# Patient Record
Sex: FEMALE | Race: WHITE | Employment: OTHER | URBAN - METROPOLITAN AREA
[De-identification: names, ages, dates, MRNs, and addresses within clinical notes are randomized per-mention and may not be internally consistent; named-entity substitution may affect disease eponyms.]

---

## 2017-06-29 ENCOUNTER — TELEPHONE (OUTPATIENT)
Dept: FAMILY MEDICINE | Facility: CLINIC | Age: 67
End: 2017-06-29

## 2017-06-29 NOTE — TELEPHONE ENCOUNTER
Per Zooppa message-    Message      Appointment Request From: Ayanna Brannon          With Provider: Sarah Ivory MD [-Primary Care Physician-]          Preferred Date Range: Any date 6/29/2017 or later          Preferred Times: Any          Reason for visit: Request an Appointment          Comments:     I need an appointment as soon as possible.  I have been in great pain in my legs from my sciatic nerve for the last week and a half.  It is not getting better.  You can call me at h:[phone] or c:[phone]

## 2017-06-30 ENCOUNTER — OFFICE VISIT (OUTPATIENT)
Dept: FAMILY MEDICINE | Facility: CLINIC | Age: 67
End: 2017-06-30
Payer: COMMERCIAL

## 2017-06-30 ENCOUNTER — RADIANT APPOINTMENT (OUTPATIENT)
Dept: GENERAL RADIOLOGY | Facility: CLINIC | Age: 67
End: 2017-06-30
Attending: INTERNAL MEDICINE
Payer: COMMERCIAL

## 2017-06-30 VITALS
HEIGHT: 62 IN | OXYGEN SATURATION: 94 % | DIASTOLIC BLOOD PRESSURE: 82 MMHG | BODY MASS INDEX: 35.99 KG/M2 | SYSTOLIC BLOOD PRESSURE: 126 MMHG | HEART RATE: 71 BPM | WEIGHT: 195.6 LBS | TEMPERATURE: 97.6 F

## 2017-06-30 DIAGNOSIS — E66.01 MORBID OBESITY DUE TO EXCESS CALORIES (H): ICD-10-CM

## 2017-06-30 DIAGNOSIS — M54.16 LUMBAR RADICULOPATHY: Primary | ICD-10-CM

## 2017-06-30 PROCEDURE — 72100 X-RAY EXAM L-S SPINE 2/3 VWS: CPT

## 2017-06-30 PROCEDURE — 99213 OFFICE O/P EST LOW 20 MIN: CPT | Performed by: INTERNAL MEDICINE

## 2017-06-30 NOTE — PROGRESS NOTES
"INTERNAL MEDICINE   SUBJECTIVE:                                                    Ayanna Brannon is a 67 year old female who presents to clinic today for the following health issues:      The patient is accompanied by her .     Leg Pain  There will be pain through her butt to her knees when she stands. There is no issue with walking, but she feels that she walks more bent over to protect from the \"zinging\" pain. She is able to sleep okay. The back of her legs are also somewhat tight. She has been swimming which makes no change in her symptoms. She denies any loss of bowel or bladder control or numbness and tingling in her groin area. She has never had these symptoms in the past. She sleeps and lays on her stomach and raising her head in this position will initiate the pain.     Onset: About a week ago.     Description:   Location: Bilateral Legs.   Character: Sharp, lasts only a few seconds.     Intensity: severe    Progression of Symptoms: worse    Accompanying Signs & Symptoms:  Other symptoms: radiation of pain to the bilateral knees     History:   Previous similar pain: no       Precipitating factors:   Trauma or overuse: no     Alleviating factors:  Improved by: rest/inactivity  Therapies Tried and outcome: Rest, 2 Ibuprofen tablets every 6 hours for the past 2 days.       Problem list and histories reviewed & adjusted, as indicated.  Additional history: as documented    Patient Active Problem List   Diagnosis     Urinary incontinence     Hypertension goal BP (blood pressure) < 140/90     Colon polyps     Advanced directives, counseling/discussion     Impaired fasting glucose     Excess body and facial hair     Hyperlipidemia LDL goal <130     External hemorrhoids     Umbilical hernia with obstruction     Past Surgical History:   Procedure Laterality Date     BIOPSY  7/6/16    Uterine for post menoposal bleeding     COLONOSCOPY      polyps first time, clear second     GENITOURINARY SURGERY      Uterine " "for post menoposal bleeding     GYN SURGERY      Uterine for post menoposal bleeding       Social History   Substance Use Topics     Smoking status: Never Smoker     Smokeless tobacco: Never Used     Alcohol use Yes      Comment: seldom     Family History   Problem Relation Age of Onset     Gynecology Mother      HEART DISEASE Father      Circulatory Maternal Grandfather      Genitourinary Problems Sister      Thyroid Disease Daughter      Coronary Artery Disease Paternal Grandfather      Breast Cancer Maternal Grandmother      Thyroid Disease Daughter          Labs reviewed in EPIC    Reviewed and updated as needed this visit by clinical staff  Tobacco  Allergies  Meds  Med Hx  Surg Hx  Fam Hx  Soc Hx      Reviewed and updated as needed this visit by Provider       ROS:  C: NEGATIVE for fever, chills, change in weight  GI: NEGATIVE for nausea, abdominal pain, heartburn, or change in bowel habits  : NEGATIVE for frequency, dysuria, or hematuria  M: POSITIVE for low back pain.   All other systems reviewed and were negative.      This document serves as a record of the services and decisions personally performed and made by Sarah Ivory MD. It was created on his/her behalf by Mary Kate Pollack, a trained medical scribe. The creation of this document is based the provider's statements to the medical scribe.    Scriborin Pollack 11:35 AM, June 30, 2017    OBJECTIVE:   /82 (BP Location: Left arm, Patient Position: Chair, Cuff Size: Adult Regular)  Pulse 71  Temp 97.6  F (36.4  C) (Oral)  Ht 1.562 m (5' 1.5\")  Wt 88.7 kg (195 lb 9.6 oz)  SpO2 94%  BMI 36.36 kg/m2  Body mass index is 36.36 kg/(m^2).  GENERAL: healthy, alert and no distress  MS: no gross musculoskeletal defects noted, no edema. No pain to palpation over the lumbar spine or sacroiliac muscle. Pain to palpation over the gluteal muscles. Negative straight leg raise bilaterally.   NEURO: Normal strength and tone in lower extremity " muscle groups, mentation intact and speech normal. Absent Achilles reflexes bilaterally and 2+ patellar reflexes bilaterally.   PSYCH: mentation appears normal, affect normal/bright    Diagnostic Test Results:  No results found for this or any previous visit (from the past 24 hour(s)).  Results for orders placed or performed in visit on 10/05/16   MA Screening Digital Bilateral    Narrative    SCREENING MAMMOGRAM, BILATERAL, DIGITAL w/CAD - 10/5/2016 3:25 PM    BREAST SYMPTOMS: No current breast complaints.     COMPARISON:  6-5-2015, 12-, 6-.    BREAST DENSITY: Scattered fibroglandular densities.    COMMENTS: No findings of suspicion for malignancy.       Impression    IMPRESSION: BI-RADS CATEGORY: 1 -  Negative    RECOMMENDED FOLLOW-UP: Annual Mammography.    Exam results letter mailed to patient.      CARMENCITA MCARTHUR MD       ASSESSMENT/PLAN:   I spent 15 minutes of time with the patient and >50% of it was in education and counseling regarding back pain treatment options.     1. Lumbar radiculopathy  Ibuprofen is working for pain relief.  Will see if physical therapy works prior to do advanced imaging and epidurals. Discussed with patient and  warning symptoms to seek emergency care.   - [facility] HAND, AND CHIROPRACTIC REFERRAL  - XR Lumbar Spine 2/3 Views    Morbid obesity - weight is clearly putting a strain on her back.  Advised to continue with swimming as long as this doesn't bother her.     Patient Instructions   - Schedule appointment with physical therapy. The next step if this does not help is an MRI.  - You can use Biofreeze in conjunction with Ibuprofen.       The information in this document, created by the medical scribe for me, accurately reflects the services I personally performed and the decisions made by me. I have reviewed and approved this document for accuracy prior to leaving the patient care area.  Sarah Ivory MD  11:36 AM, 06/30/17    Sarah Ivory MD  [facility]  [place]    Start: 11:49 AM   End: 12:04 PM

## 2017-06-30 NOTE — MR AVS SNAPSHOT
After Visit Summary   6/30/2017    Ayanna Brannon    MRN: [id number]           Patient Information     Date Of Birth          1950        Visit Information        Provider Department      6/30/2017 11:30 AM Sarah Ivory MD [facility]        Today's Diagnoses     Lumbar radiculopathy    -  1      Care Instructions    - Schedule appointment with physical therapy. The next step if this does not help is an MRI.  - You can use Biofreeze in conjunction with Ibuprofen.     [facility]    If you have any questions regarding to your visit please contact your care team:     Team Pink:   Clinic Hours Telephone Number   Internal Medicine:  Dr. Sarah Parker NP       7am-7pm  Monday - Thursday   7am-5pm  Fridays  [phone]  (Appointment scheduling available 24/7)    Questions about your visit?  Team Line  [phone]   Urgent Care - [place] and [place] - 11am-9pm Monday-Friday Saturday-Sunday- 9am-5pm   [place] - 5pm-9pm Monday-Friday Saturday-Sunday- 9am-5pm  [phone] - [place]   [phone] - [place]       What options do I have for visits at the clinic other than the traditional office visit?  To expand how we care for you, many of our providers are utilizing electronic visits (e-visits) and telephone visits, when medically appropriate, for interactions with their patients rather than a visit in the clinic.   We also offer nurse visits for many medical concerns. Just like any other service, we will bill your insurance company for this type of visit based on time spent on the phone with your provider. Not all insurance companies cover these visits. Please check with your medical insurance if this type of visit is covered. You will be responsible for any charges that are not paid by your insurance.      E-visits via CommercialTribe:  generally incur a $35.00 fee.  Telephone visits:  Time spent on the phone: *charged  based on time that is spent on the phone in increments of 10 minutes. Estimated cost:   5-10 mins $30.00   11-20 mins. $59.00   21-30 mins. $85.00   Use Mobile Backstagehart (secure email communication and access to your chart) to send your primary care provider a message or make an appointment. Ask someone on your Team how to sign up for Oculus360t.    For a Price Quote for your services, please call our Cont[employer]ul Price Line at [phone].    As always, Thank you for trusting us with your health care needs!    Discharged by Tiffany FINE CMA ([facility])            Follow-ups after your visit        Additional Services     [facility] HAND, AND CHIR[facility]RACTIC REFERRAL       **This order will print in the [facility] Scheduling Office**    Physical Therapy, Hand Therapy and Chiropractic Care are available through:    *[facility]  *[facility]  *[facility]    Call one number to schedule at any of the above locations: [phone].    Your provider has referred you to: Physical Therapy at [facility] or [facility]    Indication/Reason for Referral: Low Back Pain  Onset of Illness: 2 weeks  Therapy Orders: Evaluate and Treat  Special Programs:   Special Request:     Dixie Colin      Additional Comments for the Therapist or Chiropractor:     Please be aware that coverage of these services is subject to the terms and limitations of your health insurance plan.  Call member services at your health plan with any benefit or coverage questions.      Please bring the following to your appointment:    *Your personal calendar for scheduling future appointments  *Comfortable clothing                  Who to contact     If you have questions or need follow up information about today's clinic visit or your schedule please contact [facility] [place] directly at [phone].  Normal or non-critical lab and imaging results will be communicated to you by MyChart, letter or phone within 4 business days after the clinic has  "received the results. If you do not hear from us within 7 days, please contact the clinic through OpinewsTV or phone. If you have a critical or abnormal lab result, we will notify you by phone as soon as possible.  Submit refill requests through OpinewsTV or call your pharmacy and they will forward the refill request to us. Please allow 3 business days for your refill to be completed.          Additional Information About Your Visit        Power Efficiency[employer]Pegg Information     OpinewsTV gives you secure access to your electronic health record. If you see a primary care provider, you can also send messages to your care team and make appointments. If you have questions, please call your primary care clinic.  If you do not have a primary care provider, please call [phone] and they will assist you.        Care EveryWhere ID     This is your Care EveryWhere ID. This could be used by other organizations to access your [facility] medical records  [id number]        Your Vitals Were     Pulse Temperature Height Pulse Oximetry BMI (Body Mass Index)       71 97.6  F (36.4  C) (Oral) 5' 1.5\" (1.562 m) 94% 36.36 kg/m2        Blood Pressure from Last 3 Encounters:   06/30/17 126/82   10/05/16 122/82   09/01/16 126/82    Weight from Last 3 Encounters:   06/30/17 195 lb 9.6 oz (88.7 kg)   10/05/16 189 lb 3.2 oz (85.8 kg)   09/01/16 190 lb (86.2 kg)              We Performed the Following     [facility] PT, HAND, AND CHIROPRACTIC REFERRAL     XR Lumbar Spine 2/3 Views        Primary Care Provider Office Phone # Fax #    Sarah Ivory [phone]       [address] [address]        Equal Access to Services     GAURI KING AH: Hadjanes Colbert, mi friedman, kwabean ramosalmajose marie, madi otero. So [facility] [phone].    ATENCIÓN: Si habla español, tiene a quezada disposición servicios gratuitos de asistencia lingüística. Llame al [phone].    We " comply with applicable federal civil rights laws and [place] laws. We do not discriminate on the basis of race, color, national origin, age, disability sex, sexual orientation or gender identity.            Thank you!     Thank you for choosing [facility]  for your care. Our goal is always to provide you with excellent care. Hearing back from our patients is one way we can continue to improve our services. Please take a few minutes to complete the written survey that you may receive in the mail after your visit with us. Thank you!             Your Updated Medication List - Protect others around you: Learn how to safely use, store and throw away your medicines at www.disposemymeds.org.          This list is accurate as of: 6/30/17 12:05 PM.  Always use your most recent med list.                   Brand Name Dispense Instructions for use Diagnosis    ASPIRIN LOW STRENGTH 81 MG chewable tablet   Generic drug:  aspirin      1 TABLET DAILY        metoprolol 25 MG 24 hr tablet    TOPROL-XL    180 tablet    Take 1 tablet (25 mg) by mouth 2 times daily    Essential hypertension with goal blood pressure less than 140/90       Multi-vitamin Tabs tablet      Take 1 tablet by mouth daily

## 2017-06-30 NOTE — PATIENT INSTRUCTIONS
- Schedule appointment with physical therapy. The next step if this does not help is an MRI.  - You can use Biofreeze in conjunction with Ibuprofen.     [facility]    If you have any questions regarding to your visit please contact your care team:     Team Pink:   Clinic Hours Telephone Number   Internal Medicine:  Dr. Sarah Parker, NP       7am-7pm  Monday - Thursday   7am-5pm  Fridays  [phone]  (Appointment scheduling available 24/7)    Questions about your visit?  Team Line  [phone]   [facility] [place] [facility] [place] [facility]pm Monday-Friday Saturday-Sunday- 9am-5pm   [place] - 5pm-9pm Monday-Friday Saturday-Sunday- 9am-5pm  [phone] - [place]   [phone] - [place]       What options do I have for visits at the clinic other than the traditional office visit?  To expand how we care for you, many of our providers are utilizing electronic visits (e-visits) and telephone visits, when medically appropriate, for interactions with their patients rather than a visit in the clinic.   We also offer nurse visits for many medical concerns. Just like any other service, we will bill your insurance company for this type of visit based on time spent on the phone with your provider. Not all insurance companies cover these visits. Please check with your medical insurance if this type of visit is covered. You will be responsible for any charges that are not paid by your insurance.      E-visits via NutshellMail:  generally incur a $35.00 fee.  Telephone visits:  Time spent on the phone: *charged based on time that is spent on the phone in increments of 10 minutes. Estimated cost:   5-10 mins $30.00   11-20 mins. $59.00   21-30 mins. $85.00   Use NutshellMail (secure email communication and access to your chart) to send your primary care provider a message or make an appointment. Ask someone on your Team how to sign up for NutshellMail.    For a Price  Quote for your services, please call our Consumer Price Line at [phone].    As always, Thank you for trusting us with your health care needs!    Discharged by Tiffany FINE CMA ([facility])

## 2017-06-30 NOTE — NURSING NOTE
"Chief Complaint   Patient presents with     Musculoskeletal Problem     Bilateral leg pain, a week and a half        Initial /82 (BP Location: Left arm, Patient Position: Chair, Cuff Size: Adult Regular)  Pulse 71  Temp 97.6  F (36.4  C) (Oral)  Ht 5' 1.5\" (1.562 m)  Wt 195 lb 9.6 oz (88.7 kg)  SpO2 94%  BMI 36.36 kg/m2 Estimated body mass index is 36.36 kg/(m^2) as calculated from the following:    Height as of this encounter: 5' 1.5\" (1.562 m).    Weight as of this encounter: 195 lb 9.6 oz (88.7 kg).  Medication Reconciliation: complete   Tiffany FINE CMA ([facility])      "

## 2017-07-11 ENCOUNTER — THERAPY VISIT (OUTPATIENT)
Dept: PHYSICAL THERAPY | Facility: CLINIC | Age: 67
End: 2017-07-11
Payer: COMMERCIAL

## 2017-07-11 DIAGNOSIS — M54.16 LUMBAR RADICULOPATHY: Primary | ICD-10-CM

## 2017-07-11 PROCEDURE — 97161 PT EVAL LOW COMPLEX 20 MIN: CPT | Mod: GP | Performed by: PHYSICAL THERAPIST

## 2017-07-11 PROCEDURE — 97110 THERAPEUTIC EXERCISES: CPT | Mod: GP | Performed by: PHYSICAL THERAPIST

## 2017-07-11 NOTE — PROGRESS NOTES
"[facility] Initial Evaluation      Subjective:    Patient is a 67 year old female presenting with rehab back hpi. The history is provided by the patient. No  was used.   Ayanna Brannon is a 67 year old female with a lumbar condition.  Condition occurred with:  Insidious onset.  Condition occurred: for unknown reasons.  This is a new condition  Pt states she simply woke up with \"zingers\" into both legs June 19 without specific incident.  Has not had any back pain at any point.  Seems better over the past three days. Referred to PT 06/30/2017.      Radiates to:  Thigh right and thigh left.  Quality: \"zingers\" and is intermittent and reported as 3/10.   Pain is worse in the A.M..  Exacerbated by: climbing stairs, carrying objects, twisting, reaching upward, walking, turning in bed. Relieved by: keeping the head down.  Since onset symptoms are gradually improving (fewer \"zingers\" lately).  Special tests:  X-ray (in chart 06/30/2017).      General health as reported by patient is good.  Pertinent medical history includes:  Overweight and high blood pressure.  Medical allergies: no.  Other surgeries include:  Other (hysterectomy).  Current medications:  Anti-inflammatory and high blood pressure medication.  Current occupation is retired.        Barriers include:  None as reported by the patient.    Red flags:  None as reported by the patient.    Pt states her goal is to \"get back to walking without worrying.\"                    Objective:    System         Lumbar/SI Evaluation  ROM:      Strength: TA MMT 1/5  Lumbar Myotomes:    T12-L3 (Hip Flex):  Left: 5    Right: 5  L2-4 (Quads):  Left:  5    Right:  5  L4 (Ankle DF):  Left:  5    Right:  5  L5 (Great Toe Ext): Left: 5    Right: 5   S1 (Toe Raise):  Left: 5    Right: 5  Lumbar DTR's:    L4 (Quad):  Left:  2   Right:  2  S1 (Achilles):  Left:  1   Right:  1    Lumbar Dermtomes:  normal                Neural Tension/Mobility:  "     Left side:SLR or SLR w/DF  negative.     Right side:   SLR w/DF or SLR  negative.   Lumbar Palpation:  normal        Lumbar Provocation:      Left negative with:  PROM hip    Right negative with:  PROM hip    SI joint/Sacrum:    Negative Bill thigh thrustANASTASIYA Lumbar Evaluation    Posture:  Sitting: fair  Standing: fair  Lordosis: WNL  Lateral Shift: no  Correction of Posture: no effect    Movement Loss:  Flexion (Flex): min  Extension (EXT): mod and pain  Side Glide R (SG R): mod  Side Glide L (SG L): mod  Test Movements:  FIS: During: no effect  After: no effect  Pretest Movements: to B posterior knees  Repeat FIS: During: no effect  After: no effect    EIS: During: peripheralizing  After: peripheralizing    Repeat EIS: During: centralizing  After: centralizing  Mechanical Response: IncROM  NANCI: During: no effect  After: no effect    Repeat NANCI: During: no effect  After: no effect    EIL: During: centralizing  After: centralizing    Repeat EIL: During: centralizing  After: centralizing  Mechanical Response: IncROM        Conclusion: derangement  Principle of Treatment:      Extension: REIL                                           ROS    Assessment/Plan:      Patient is a 67 year old female with lumbar complaints.    Patient has the following significant findings with corresponding treatment plan.                Diagnosis 1:  Lumbar radiculopathy  Pain -  hot/cold therapy and directional preference exercise  Decreased ROM/flexibility - manual therapy and therapeutic exercise  Decreased strength - therapeutic exercise and therapeutic activities  Decreased function - therapeutic activities    Therapy Evaluation Codes:   1) History comprised of:   Personal factors that impact the plan of care:      None.    Comorbidity factors that impact the plan of care are:      High blood pressure and Overweight.     Medications impacting care:  Anti-inflammatory and High blood pressure.  2) Examination of Body Systems comprised of:   Body structures and functions that impact the plan of care:      Lumbar spine.   Activity limitations that impact the plan of care are:      Lifting, Stairs and Walking.  3) Clinical presentation characteristics are:   Stable/Uncomplicated.  4) Decision-Making    Low complexity using standardized patient assessment instrument and/or measureable assessment of functional outcome.  Cumulative Therapy Evaluation is: Low complexity.    Previous and current functional limitations:  (See Goal Flow Sheet for this information)    Short term and Long term goals: (See Goal Flow Sheet for this information)     Communication ability:  Patient appears to be able to clearly communicate and understand verbal and written communication and follow directions correctly.  Treatment Explanation - The following has been discussed with the patient:   RX ordered/plan of care  Anticipated outcomes  Possible risks and side effects  This patient would benefit from PT intervention to resume normal activities.   Rehab potential is good.    Frequency:  2 X week, once daily  Duration:  for 3 weeks  Discharge Plan:  Achieve all LTG.  Independent in home treatment program.  Reach maximal therapeutic benefit.    Please refer to the daily flowsheet for treatment today, total treatment time and time spent performing 1:1 timed codes.

## 2017-07-11 NOTE — MR AVS SNAPSHOT
After Visit Summary   7/11/2017    Ayanna Brannon    MRN: [id number]           Patient Information     Date Of Birth          1950        Visit Information        Provider Department      7/11/2017 12:40 PM Lester Wild PT [facility] [place] [facility]        Today's Diagnoses     Lumbar radiculopathy    -  1       Follow-ups after your visit        Your next 10 appointments already scheduled     Jul 19, 2017  2:40 PM CDT   [facility] with Lester Wild PT   [facility] [place] [facility] ([facility] [place])    [address]  [address]  [place] [address]   [phone]            Jul 26, 2017  2:40 PM CDT   [facility] Spine with Lester Wild PT   [facility] [place] [facility] ([facility] [place])    [address]  [address]  [place] [address]   [phone]              Who to contact     If you have questions or need follow up information about today's clinic visit or your schedule please contact [facility] directly at [phone].  Normal or non-critical lab and imaging results will be communicated to you by algranohart, letter or phone within 4 business days after the clinic has received the results. If you do not hear from us within 7 days, please contact the clinic through [email or web address] or phone. If you have a critical or abnormal lab result, we will notify you by phone as soon as possible.  Submit refill requests through Gamervision or call your pharmacy and they will forward the refill request to us. Please allow 3 business days for your refill to be completed.          Additional Information About Your Visit        MyChart Information     Gamervision gives you secure access to your electronic health record. If you see a primary care provider, you can also send messages to your care team and make appointments. If you have questions, please call your primary care clinic.  If you do not have a primary care provider,  please call [phone] and they will assist you.        Care EveryWhere ID     This is your Care EveryWhere ID. This could be used by other organizations to access your [place] medical records  [id number]         Blood Pressure from Last 3 Encounters:   06/30/17 126/82   10/05/16 122/82   09/01/16 126/82    Weight from Last 3 Encounters:   06/30/17 88.7 kg (195 lb 9.6 oz)   10/05/16 85.8 kg (189 lb 3.2 oz)   09/01/16 86.2 kg (190 lb)              We Performed the Following     PT Eval, Low Complexity (18226)     Therapeutic Exercises        Primary Care Provider Office Phone # Fax #    Sarah Ivory [phone]       [address] [address]        Equal Access to Services     [facility]: Hadii aad ku hadasho Soomaali, waaxda luqadaha, qaybta kaalmada adeegyada, waxay cayetanoin hayaan adekelly moreau . So [facility] [phone].    ATENCIÓN: Si habla español, tiene a quezada disposición servicios gratuitos de asistencia lingüística. Llame al [phone].    We comply with applicable federal civil rights laws and [place] laws. We do not discriminate on the basis of race, color, national origin, age, disability sex, sexual orientation or gender identity.            Thank you!     Thank you for choosing [facility]  for your care. Our goal is always to provide you with excellent care. Hearing back from our patients is one way we can continue to improve our services. Please take a few minutes to complete the written survey that you may receive in the mail after your visit with us. Thank you!             Your Updated Medication List - Protect others around you: Learn how to safely use, store and throw away your medicines at www.disposemymeds.org.          This list is accurate as of: 7/11/17  2:12 PM.  Always use your most recent med list.                   Brand Name Dispense Instructions for use Diagnosis    ASPIRIN LOW  STRENGTH 81 MG chewable tablet   Generic drug:  aspirin      1 TABLET DAILY        metoprolol 25 MG 24 hr tablet    TOPROL-XL    180 tablet    Take 1 tablet (25 mg) by mouth 2 times daily    Essential hypertension with goal blood pressure less than 140/90       Multi-vitamin Tabs tablet      Take 1 tablet by mouth daily

## 2017-07-19 ENCOUNTER — THERAPY VISIT (OUTPATIENT)
Dept: PHYSICAL THERAPY | Facility: CLINIC | Age: 67
End: 2017-07-19
Payer: COMMERCIAL

## 2017-07-19 DIAGNOSIS — M54.16 LUMBAR RADICULOPATHY: ICD-10-CM

## 2017-07-19 PROCEDURE — 97530 THERAPEUTIC ACTIVITIES: CPT | Mod: GP | Performed by: PHYSICAL THERAPIST

## 2017-07-19 PROCEDURE — 97110 THERAPEUTIC EXERCISES: CPT | Mod: GP | Performed by: PHYSICAL THERAPIST

## 2017-07-19 NOTE — PROGRESS NOTES
"Subjective:    HPI                    Objective:    System    Physical Exam    General     ROS    Assessment/Plan:      SUBJECTIVE  Subjective: Pt reports fewer zingers overall but back of thigs feel tight still.  Walking can still be sore but standing is easier.   Current Pain level:  (not rated numerically)   Changes in function:  Yes (See Goal flowsheet attached for changes in current functional level)     Adverse reaction to treatment or activity:  None    OBJECTIVE  Objective: Pt ambulates reciprocally on stairs with railing, although winded on ascent upon completion.  \"Tight\" in hamstrings with standing trunk flexion but no pain.  Standing trunk extension reported to be sore on first repetition but got better with repetition.     ASSESSMENT  Ayanna continues to require intervention to meet STG and LTG's: PT  Patient is progressing as expected.  Response to therapy has shown an improvement in  function  Progress made towards STG/LTG?  Yes (See Goal flowsheet attached for updates on achievement of STG and LTG)    PLAN  Current treatment program is being advanced to more complex exercises.  One visit remains scheduled.  Reassess and determine further course of care from there.    PTA/ATC plan:  N/A    Please refer to the daily flowsheet for treatment today, total treatment time and time spent performing 1:1 timed codes.              "

## 2017-07-19 NOTE — MR AVS SNAPSHOT
After Visit Summary   7/19/2017    Ayanna Brnanon    MRN: [id number]           Patient Information     Date Of Birth          1950        Visit Information        Provider Department      7/19/2017 2:40 PM Lester Wild PT [facility] [place] [facility]        Today's Diagnoses     Lumbar radiculopathy           Follow-ups after your visit        Your next 10 appointments already scheduled     Jul 26, 2017  2:40 PM CDT   [facility] Sp[facility] with Lester Wild PT   [facility] [place] [facility] ([facility] [place])    [address]  [address]  [place] [address] [phone]              Who to contact     If you have questions or need follow up information about today's clinic visit or your schedule please contact [facility] directly at [phone].  Normal or non-critical lab and imaging results will be communicated to you by EcoStarthart, letter or phone within 4 business days after the clinic has received the results. If you do not hear from us within 7 days, please contact the clinic through EcoStarthart or phone. If you have a critical or abnormal lab result, we will notify you by phone as soon as possible.  Submit refill requests through GeeYuu or call your pharmacy and they will forward the refill request to us. Please allow 3 business days for your refill to be completed.          Additional Information About Your Visit        MyChart Information     GeeYuu gives you secure access to your electronic health record. If you see a primary care provider, you can also send messages to your care team and make appointments. If you have questions, please call your primary care clinic.  If you do not have a primary care provider, please call [phone] and they will assist you.        Care EveryWhere ID     This is your Care EveryWhere ID. This could be used by other organizations to access your [facility] medical records  [id number]          Blood Pressure from Last 3 Encounters:   06/30/17 126/82   10/05/16 122/82   09/01/16 126/82    Weight from Last 3 Encounters:   06/30/17 88.7 kg (195 lb 9.6 oz)   10/05/16 85.8 kg (189 lb 3.2 oz)   09/01/16 86.2 kg (190 lb)              We Performed the Following     Therapeutic Activities     Therapeutic Exercises        Primary Care Provider Office Phone # Fax #    Sarah Ivory [phone]       [address] [address]        Equal Access to Services     [facility]: Hadii aad ku hadasho Soomaali, waaxda luqadaha, qaybta kaalmada adeegyada, madi moreau . So [facility] [phone].    ATENCIÓN: Si habla español, tiene a quezada disposición servicios gratuitos de asistencia lingüística. Llame al [phone].    We comply with applicable federal civil rights laws and [place] laws. We do not discriminate on the basis of race, color, national origin, age, disability sex, sexual orientation or gender identity.            Thank you!     Thank you for choosing [facility]   for your care. Our goal is always to provide you with excellent care. Hearing back from our patients is one way we can continue to improve our services. Please take a few minutes to complete the written survey that you may receive in the mail after your visit with us. Thank you!             Your Updated Medication List - Protect others around you: Learn how to safely use, store and throw away your medicines at www.disposemymeds.org.          This list is accurate as of: 7/19/17  3:37 PM.  Always use your most recent med list.                   Brand Name Dispense Instructions for use Diagnosis    ASPIRIN LOW STRENGTH 81 MG chewable tablet   Generic drug:  aspirin      1 TABLET DAILY        metoprolol 25 MG 24 hr tablet    TOPROL-XL    180 tablet    Take 1 tablet (25 mg) by mouth 2 times daily    Essential hypertension with goal blood  pressure less than 140/90       Multi-vitamin Tabs tablet      Take 1 tablet by mouth daily

## 2017-07-20 ENCOUNTER — TELEPHONE (OUTPATIENT)
Dept: FAMILY MEDICINE | Facility: CLINIC | Age: 67
End: 2017-07-20

## 2017-07-20 DIAGNOSIS — M54.16 LUMBAR RADICULOPATHY: Primary | ICD-10-CM

## 2017-07-20 NOTE — TELEPHONE ENCOUNTER
Patient has developed continual numbness/tingling in her feet. This began about two weeks ago. She has completed 2 PT sessions.   She does have feeling and control.    Per provider note on 6/30/17  Patient Instructions   - Schedule appointment with physical therapy. The next step if this does not help is an MRI.  - You can use Biofreeze in conjunction with Ibuprofen.    Please advise on MRI.  Africa Vega RN

## 2017-07-20 NOTE — TELEPHONE ENCOUNTER
Reason for Call:  Other call back    Detailed comments: patient calling and stating that she was seen a few weeks back. Patient says she is starting to have numbness in her feet now. Patient would like to know if she should come back in to be seen, or what would be the next steps. Please contact patient to discuss further.    Phone Number Patient can be reached at: Home number on file [phone] (home) or cell phone [phone] Cell phone. Messages could be left on both phones    Best Time: any time    Can we leave a detailed message on this number? YES    Call taken on 7/20/2017 at 8:25 AM by Johana Eric

## 2017-07-21 NOTE — TELEPHONE ENCOUNTER
Called patient and completed answers.  MRi order placed.  Phone number to imaging given.    Patient then asked to go to [facility] instead.  Please fax to     Patient will be calling in 1 hour to schedule    Esperanza Shields RN

## 2017-07-24 ENCOUNTER — TRANSFERRED RECORDS (OUTPATIENT)
Dept: HEALTH INFORMATION MANAGEMENT | Facility: CLINIC | Age: 67
End: 2017-07-24

## 2017-07-26 ENCOUNTER — THERAPY VISIT (OUTPATIENT)
Dept: PHYSICAL THERAPY | Facility: CLINIC | Age: 67
End: 2017-07-26
Payer: COMMERCIAL

## 2017-07-26 DIAGNOSIS — M54.16 LUMBAR RADICULOPATHY: ICD-10-CM

## 2017-07-26 PROCEDURE — 97140 MANUAL THERAPY 1/> REGIONS: CPT | Mod: GP | Performed by: PHYSICAL THERAPIST

## 2017-07-26 PROCEDURE — 97110 THERAPEUTIC EXERCISES: CPT | Mod: GP | Performed by: PHYSICAL THERAPIST

## 2017-07-26 NOTE — PROGRESS NOTES
"Subjective:    HPI                    Objective:    System    Physical Exam    General     ROS    Assessment/Plan:      SUBJECTIVE  Subjective: Pt indicates not doing as well as last week.  Has felt some increased tingling down both legs with continued \"zingers\" at times.  Completed MRI earlier this week and is awaiting results.   Current Pain level: 3/10   Changes in function:  Yes (See Goal flowsheet attached for changes in current functional level)     Adverse reaction to treatment or activity:  None    OBJECTIVE  Objective: No change in symptoms with trunk AROM all directions in standing and REIL.  Long leg distraction reported to reduce tingling to B calves.     ASSESSMENT  Ayanna continues to require intervention to meet STG and LTG's: PT  Patient has experienced an exacerbation of symptoms.  Response to therapy has shown lack of progress in  function  Progress made towards STG/LTG?  Yes (See Goal flowsheet attached for updates on achievement of STG and LTG)    PLAN  Pt presents today with changed symptoms since last I last saw her and is awaiting imaging results.  No further PT scheduled currently but anticipate guidance based upon diagnostic work up.    PTA/ATC plan:  N/A    Please refer to the daily flowsheet for treatment today, total treatment time and time spent performing 1:1 timed codes.              "

## 2017-07-26 NOTE — MR AVS SNAPSHOT
After Visit Summary   7/26/2017    Ayanna Brannon    MRN: [id number]           Patient Information     Date Of Birth          1950        Visit Information        Provider Department      7/26/2017 2:40 PM Lester Wild PT [facility] [place] [facility]        Today's Diagnoses     Lumbar radiculopathy           Follow-ups after your visit        Who to contact     If you have questions or need follow up information about today's clinic visit or your schedule please contact [facility] directly at [phone].  Normal or non-critical lab and imaging results will be communicated to you by Pinnacle Biologicshart, letter or phone within 4 business days after the clinic has received the results. If you do not hear from us within 7 days, please contact the clinic through FTL Global Solutionst or phone. If you have a critical or abnormal lab result, we will notify you by phone as soon as possible.  Submit refill requests through Food on the Table or call your pharmacy and they will forward the refill request to us. Please allow 3 business days for your refill to be completed.          Additional Information About Your Visit        MyChart Information     Food on the Table gives you secure access to your electronic health record. If you see a primary care provider, you can also send messages to your care team and make appointments. If you have questions, please call your primary care clinic.  If you do not have a primary care provider, please call [phone] and they will assist you.        Care EveryWhere ID     This is your Care EveryWhere ID. This could be used by other organizations to access your [place] medical records  [id number]         Blood Pressure from Last 3 Encounters:   06/30/17 126/82   10/05/16 122/82   09/01/16 126/82    Weight from Last 3 Encounters:   06/30/17 88.7 kg (195 lb 9.6 oz)   10/05/16 85.8 kg (189 lb 3.2 oz)   09/01/16 86.2 kg (190 lb)              We Performed the Following      Manual Ther Tech, 1+Regions, EA 15 min     Therapeutic Exercises        Primary Care Provider Office Phone # Fax #    Sarah Rula Ivory [phone]       [address] [address]        Equal Access to Services     [facility] : Hadii aad ku hadasho Soomaali, waaxda luqadaha, qaybta kaalmada adeegyada, waxay idiin hayaan adeeg khkourtneysh la'richien ah. So [facility] [phone].    ATENCIÓN: Si habla español, tiene a quezada disposición servicios gratuitos de asistencia lingüística. Evelyn al [phone].    We comply with applicable federal civil rights laws and [place] laws. We do not discriminate on the basis of race, color, national origin, age, disability sex, sexual orientation or gender identity.            Thank you!     Thank you for choosing [facility]   for your care. Our goal is always to provide you with excellent care. Hearing back from our patients is one way we can continue to improve our services. Please take a few minutes to complete the written survey that you may receive in the mail after your visit with us. Thank you!             Your Updated Medication List - Protect others around you: Learn how to safely use, store and throw away your medicines at www.disposemymeds.org.          This list is accurate as of: 7/26/17  4:22 PM.  Always use your most recent med list.                   Brand Name Dispense Instructions for use Diagnosis    ASPIRIN LOW STRENGTH 81 MG chewable tablet   Generic drug:  aspirin      1 TABLET DAILY        metoprolol 25 MG 24 hr tablet    TOPROL-XL    180 tablet    Take 1 tablet (25 mg) by mouth 2 times daily    Essential hypertension with goal blood pressure less than 140/90       Multi-vitamin Tabs tablet      Take 1 tablet by mouth daily

## 2017-07-28 ENCOUNTER — TELEPHONE (OUTPATIENT)
Dept: FAMILY MEDICINE | Facility: CLINIC | Age: 67
End: 2017-07-28

## 2017-07-28 DIAGNOSIS — Z79.899 HIGH RISK MEDICATION USE: Primary | ICD-10-CM

## 2017-07-28 DIAGNOSIS — M54.16 LUMBAR RADICULOPATHY: ICD-10-CM

## 2017-07-28 NOTE — TELEPHONE ENCOUNTER
Reason for Call:  Other call back    Detailed comments:  Patient calling. [facility] She did not hear the results yet. Please call her.     Phone Number Patient can be reached at: Home number on file [phone] (home)    Best Time:  Any     Can we leave a detailed message on this number? YES    Call taken on 7/28/2017 at 10:13 AM by Loli Cervantes

## 2017-07-28 NOTE — TELEPHONE ENCOUNTER
I called.  Will see spine surgery.  Continue with physical therapy .  Ibuprofen bid.  Will come in for creatinine.

## 2017-08-01 DIAGNOSIS — Z79.899 HIGH RISK MEDICATION USE: ICD-10-CM

## 2017-08-01 LAB
ANION GAP SERPL CALCULATED.3IONS-SCNC: 14 MMOL/L (ref 3–14)
BUN SERPL-MCNC: 20 MG/DL (ref 7–30)
CALCIUM SERPL-MCNC: 9 MG/DL (ref 8.5–10.1)
CHLORIDE SERPL-SCNC: 106 MMOL/L (ref 94–109)
CO2 SERPL-SCNC: 20 MMOL/L (ref 20–32)
CREAT SERPL-MCNC: 0.75 MG/DL (ref 0.52–1.04)
GFR SERPL CREATININE-BSD FRML MDRD: 78 ML/MIN/1.7M2
GLUCOSE SERPL-MCNC: 214 MG/DL (ref 70–99)
POTASSIUM SERPL-SCNC: 3.8 MMOL/L (ref 3.4–5.3)
SODIUM SERPL-SCNC: 140 MMOL/L (ref 133–144)

## 2017-08-01 PROCEDURE — 36415 COLL VENOUS BLD VENIPUNCTURE: CPT | Performed by: INTERNAL MEDICINE

## 2017-08-01 PROCEDURE — 80048 BASIC METABOLIC PNL TOTAL CA: CPT | Performed by: INTERNAL MEDICINE

## 2017-08-04 ENCOUNTER — TRANSFERRED RECORDS (OUTPATIENT)
Dept: HEALTH INFORMATION MANAGEMENT | Facility: CLINIC | Age: 67
End: 2017-08-04

## 2017-08-04 NOTE — TELEPHONE ENCOUNTER
Report has been received and given to Dr. Ivory. Ofelia Perez,    need for outpatient follow-up/return to ED if symptoms worsen, persist or questions arise

## 2017-08-18 DIAGNOSIS — I10 ESSENTIAL HYPERTENSION: ICD-10-CM

## 2017-08-21 NOTE — TELEPHONE ENCOUNTER
metoprolol (TOPROL-XL) 25 MG 24 hr tablet      Last Written Prescription Date: 10/5/16  Last Fill Quantity: 180, # refills: 11  Last Office Visit with G, P or [facility] prescribing provider: 6/30/17       Potassium   Date Value Ref Range Status   08/01/2017 3.8 3.4 - 5.3 mmol/L Final     Creatinine   Date Value Ref Range Status   08/01/2017 0.75 0.52 - 1.04 mg/dL Final     BP Readings from Last 3 Encounters:   06/30/17 126/82   10/05/16 122/82   09/01/16 126/82

## 2017-08-22 RX ORDER — METOPROLOL SUCCINATE 25 MG/1
TABLET, EXTENDED RELEASE ORAL
Qty: 180 TABLET | Refills: 2 | Status: SHIPPED | OUTPATIENT
Start: 2017-08-22 | End: 2018-06-18

## 2017-08-22 NOTE — TELEPHONE ENCOUNTER
Prescription approved per [facility] Refill Protocol.  Gudelia Gomez, RN - BC

## 2017-08-29 NOTE — PROGRESS NOTES
Pt last seen in PT 07/26.  Called pt.  She reports has since had injection that did not provide any change.  Will be seeing Dr Dumont for surgical consult f/u later this week and will call me with plan.  She also indicates has trip planned to [place] 09/09-16 and will listen to body for response to activities.

## 2017-09-01 ENCOUNTER — TRANSFERRED RECORDS (OUTPATIENT)
Dept: HEALTH INFORMATION MANAGEMENT | Facility: CLINIC | Age: 67
End: 2017-09-01

## 2017-09-25 ENCOUNTER — THERAPY VISIT (OUTPATIENT)
Dept: PHYSICAL THERAPY | Facility: CLINIC | Age: 67
End: 2017-09-25
Payer: COMMERCIAL

## 2017-09-25 DIAGNOSIS — M54.16 LUMBAR RADICULOPATHY: ICD-10-CM

## 2017-09-25 PROCEDURE — 97164 PT RE-EVAL EST PLAN CARE: CPT | Mod: GP | Performed by: PHYSICAL THERAPIST

## 2017-09-25 PROCEDURE — 97110 THERAPEUTIC EXERCISES: CPT | Mod: GP | Performed by: PHYSICAL THERAPIST

## 2017-09-25 NOTE — PROGRESS NOTES
Subjective:    HPI                    Objective:    System    Physical Exam    General     ROS    Assessment/Plan:      PROGRESS  REPORT    Progress reporting period is from 07/11/2017 to today.       SUBJECTIVE  Subjective: Pt reports she still has some tingling in her legs but has no pain and is noticing definite progress.  Injection 08/18 pt states wasn't helpful initially but has noticed progress since then.    Current Pain level: 0/10.     Initial Pain level: 3/10.   Changes in function:  Yes (See Goal flowsheet attached for changes in current functional level)  Adverse reaction to treatment or activity: None    OBJECTIVE  Objective: No discomfort or gross restriction with trunk AROM all directions.  TA MMT 1/5.  Negative Bill, thigh thrust, FADIR, SLR B.  No distal strength or sensation to light touch asymmetry.     ASSESSMENT/PLAN  Updated problem list and treatment plan: Diagnosis 1:  LBP  Decreased strength - therapeutic exercise and therapeutic activities  Decreased function - therapeutic activities and neuromuscular re-education  STG/LTGs have been met or progress has been made towards goals:  Yes (See Goal flow sheet completed today.)  Assessment of Progress: The patient's condition is improving.  Self Management Plans:  Patient has been instructed in a home treatment program.  I have re-evaluated this patient and find that the nature, scope, duration and intensity of the therapy is appropriate for the medical condition of the patient.  Ayanna continues to require the following intervention to meet STG and LTG's:  PT    Recommendations:  Pt returns to PT today with change in status relative to when last seen in 07/26/2017.  Revised plan of care to focus on progression of strengthening.  Anticipate two add'l visits of PT over the next two to three weeks.    Please refer to the daily flowsheet for treatment today, total treatment time and time spent performing 1:1 timed codes.

## 2017-09-25 NOTE — MR AVS SNAPSHOT
After Visit Summary   9/25/2017    Ayanna Brannon    MRN: [id number]           Patient Information     Date Of Birth          1950        Visit Information        Provider Department      9/25/2017 1:10 PM Lester Wild PT [facility] [place] [facility]        Today's Diagnoses     Lumbar radiculopathy           Follow-ups after your visit        Your next 10 appointments already scheduled     Oct 02, 2017  1:10 PM CDT   [facility] with Lester Wild PT   [facility] [place] [facility] ([facility] [place])    [address]  [address]  [place] [id number] [phone]              Who to contact     If you have questions or need follow up information about today's clinic visit or your schedule please contact [facility] directly at [phone].  Normal or non-critical lab and imaging results will be communicated to you by Credit Coachhart, letter or phone within 4 business days after the clinic has received the results. If you do not hear from us within 7 days, please contact the clinic through Credit Coachhart or phone. If you have a critical or abnormal lab result, we will notify you by phone as soon as possible.  Submit refill requests through Penumbra or call your pharmacy and they will forward the refill request to us. Please allow 3 business days for your refill to be completed.          Additional Information About Your Visit        MyChart Information     Penumbra gives you secure access to your electronic health record. If you see a primary care provider, you can also send messages to your care team and make appointments. If you have questions, please call your primary care clinic.  If you do not have a primary care provider, please call [phone] and they will assist you.        Care EveryWhere ID     This is your Care EveryWhere ID. This could be used by other organizations to access your [place] medical records  [id number]          Blood Pressure from Last 3 Encounters:   06/30/17 126/82   10/05/16 122/82   09/01/16 126/82    Weight from Last 3 Encounters:   06/30/17 88.7 kg (195 lb 9.6 oz)   10/05/16 85.8 kg (189 lb 3.2 oz)   09/01/16 86.2 kg (190 lb)              We Performed the Following     PT Re-Eval ([id number])     Therapeutic Exercises        Primary Care Provider Office Phone # Fax #    Sarah Rula Ivory [phone] [address]  [facility] [address]        Equal Access to Services     [facility]: Hadii aad elton hadryano Sotory, waaxda luqadaha, qaybta kaalmada adekellyyada, madi moreau . So [facility] [phone].    ATENCIÓN: Si habla español, tiene a quezada disposición servicios gratuitos de asistencia lingüística. [facility] [phone].    We comply with applicable federal civil rights laws and [place] laws. We do not discriminate on the basis of race, color, national origin, age, disability sex, sexual orientation or gender identity.            Thank you!     Thank you for choosing [facility]  for your care. Our goal is always to provide you with excellent care. Hearing back from our patients is one way we can continue to improve our services. Please take a few minutes to complete the written survey that you may receive in the mail after your visit with us. Thank you!             Your Updated Medication List - Protect others around you: Learn how to safely use, store and throw away your medicines at www.disposemymeds.org.          This list is accurate as of: 9/25/17  3:26 PM.  Always use your most recent med list.                   Brand Name Dispense Instructions for use Diagnosis    ASPIRIN LOW STRENGTH 81 MG chewable tablet   Generic drug:  aspirin      1 TABLET DAILY        * metoprolol 25 MG 24 hr tablet    TOPROL-XL    180 tablet    Take 1 tablet (25 mg) by mouth 2 times daily    Essential hypertension with goal blood pressure less than 140/90        * metoprolol 25 MG 24 hr tablet    TOPROL-XL    180 tablet    TAKE 1 TABLET(25 MG) BY MOUTH TWICE DAILY    Essential hypertension       Multi-vitamin Tabs tablet      Take 1 tablet by mouth daily        * Notice:  This list has 2 medication(s) that are the same as other medications prescribed for you. Read the directions carefully, and ask your doctor or other care provider to review them with you.

## 2017-10-02 ENCOUNTER — THERAPY VISIT (OUTPATIENT)
Dept: PHYSICAL THERAPY | Facility: CLINIC | Age: 67
End: 2017-10-02
Payer: COMMERCIAL

## 2017-10-02 DIAGNOSIS — M54.16 LUMBAR RADICULOPATHY: ICD-10-CM

## 2017-10-02 PROCEDURE — 97112 NEUROMUSCULAR REEDUCATION: CPT | Mod: GP | Performed by: PHYSICAL THERAPIST

## 2017-10-02 PROCEDURE — 97110 THERAPEUTIC EXERCISES: CPT | Mod: GP | Performed by: PHYSICAL THERAPIST

## 2017-10-02 NOTE — PROGRESS NOTES
"Subjective:    HPI  Oswestry Score: 0 %                 Objective:    System    Physical Exam    General     ROS    Assessment/Plan:      DISCHARGE REPORT    Progress reporting period is from 07/11/2017 to today.       SUBJECTIVE  Subjective: \"I can do all of the things I'd like to be able to do.  This is what I was hoping for.\"    Current Pain level: 0/10.     Initial Pain level: 3/10.   Changes in function:  Yes (See Goal flowsheet attached for changes in current functional level)  Adverse reaction to treatment or activity: None    OBJECTIVE  Objective: No discomfort trunk AROM all directions.  TA MMT 1/5.     ASSESSMENT/PLAN  Updated problem list and treatment plan: Diagnosis 1:  Home program  STG/LTGs have been met or progress has been made towards goals:  Yes (See Goal flow sheet completed today.)  Assessment of Progress: The patient has met all of their long term goals.  Self Management Plans:  Patient is independent in a home treatment program.  I have re-evaluated this patient and find that the nature, scope, duration and intensity of the therapy is appropriate for the medical condition of the patient.  Ayanna continues to require the following intervention to meet STG and LTG's:  PT intervention is no longer required to meet STG/LTG.    Recommendations:  Given progress, pt agrees discharge to [facility] but will let me know if there are further issues.    Please refer to the daily flowsheet for treatment today, total treatment time and time spent performing 1:1 timed codes.                "

## 2017-10-02 NOTE — MR AVS SNAPSHOT
After Visit Summary   10/2/2017    Ayanna Brannon    MRN: [id number]           Patient Information     Date Of Birth          1950        Visit Information        Provider Department      10/2/2017 1:10 PM Lester Wild PT [facility] [place] [facility]        Today's Diagnoses     Lumbar radiculopathy           Follow-ups after your visit        Who to contact     If you have questions or need follow up information about today's clinic visit or your schedule please contact [facility] directly at [phone].  Normal or non-critical lab and imaging results will be communicated to you by [employer], letter or phone within 4 business days after the clinic has received the results. If you do not hear from us within 7 days, please contact the clinic through VIRIDAXISt or phone. If you have a critical or abnormal lab result, we will notify you by phone as soon as possible.  Submit refill requests through Nonoba or call your pharmacy and they will forward the refill request to us. Please allow 3 business days for your refill to be completed.          Additional Information About Your Visit        MyChart Information     Nonoba gives you secure access to your electronic health record. If you see a primary care provider, you can also send messages to your care team and make appointments. If you have questions, please call your primary care clinic.  If you do not have a primary care provider, please call [phone] and they will assist you.        Care EveryWhere ID     This is your Care EveryWhere ID. This could be used by other organizations to access your [place] medical records  [id number]         Blood Pressure from Last 3 Encounters:   06/30/17 126/82   10/05/16 122/82   09/01/16 126/82    Weight from Last 3 Encounters:   06/30/17 88.7 kg (195 lb 9.6 oz)   10/05/16 85.8 kg (189 lb 3.2 oz)   09/01/16 86.2 kg (190 lb)              We Performed the Following      [facility] Progress Notes Report     Neuromuscular Re-Education     Therapeutic Exercises        Primary Care Provider Office Phone # Fax #    Sarah Ivory [phone]       [address] [facility] [place]  [place] [address]        Equal Access to Services     [facility]RISTINE : Hadii aad ku hadryano Soomaali, waaxda luqadaha, qaybta kaalmada adeegyada, waxlivier idiin hayaan adekelly huff lapollyn . So [facility] [phone].    ATENCIÓN: Si habla español, tiene a quezada disposición servicios gratuitos de asistencia lingüística. Llame al [phone].    We comply with applicable federal civil rights laws and [place] laws. We do not discriminate on the basis of race, color, national origin, age, disability, sex, sexual orientation, or gender identity.            Thank you!     Thank you for choosing [facility]   for your care. Our goal is always to provide you with excellent care. Hearing back from our patients is one way we can continue to improve our services. Please take a few minutes to complete the written survey that you may receive in the mail after your visit with us. Thank you!             Your Updated Medication List - Protect others around you: Learn how to safely use, store and throw away your medicines at www.disposemymeds.org.          This list is accurate as of: 10/2/17  2:19 PM.  Always use your most recent med list.                   Brand Name Dispense Instructions for use Diagnosis    ASPIRIN LOW STRENGTH 81 MG chewable tablet   Generic drug:  aspirin      1 TABLET DAILY        * metoprolol 25 MG 24 hr tablet    TOPROL-XL    180 tablet    Take 1 tablet (25 mg) by mouth 2 times daily    Essential hypertension with goal blood pressure less than 140/90       * metoprolol 25 MG 24 hr tablet    TOPROL-XL    180 tablet    TAKE 1 TABLET(25 MG) BY MOUTH TWICE DAILY    Essential hypertension       Multi-vitamin Tabs tablet      Take 1 tablet by mouth daily        * Notice:  This list  has 2 medication(s) that are the same as other medications prescribed for you. Read the directions carefully, and ask your doctor or other care provider to review them with you.

## 2018-01-30 ENCOUNTER — OFFICE VISIT (OUTPATIENT)
Dept: INTERNAL MEDICINE | Facility: CLINIC | Age: 68
End: 2018-01-30
Payer: COMMERCIAL

## 2018-01-30 VITALS
WEIGHT: 197 LBS | HEIGHT: 68 IN | SYSTOLIC BLOOD PRESSURE: 112 MMHG | RESPIRATION RATE: 16 BRPM | OXYGEN SATURATION: 94 % | HEART RATE: 89 BPM | BODY MASS INDEX: 29.86 KG/M2 | DIASTOLIC BLOOD PRESSURE: 72 MMHG

## 2018-01-30 DIAGNOSIS — B02.9 HERPES ZOSTER WITHOUT COMPLICATION: Primary | ICD-10-CM

## 2018-01-30 PROCEDURE — 99213 OFFICE O/P EST LOW 20 MIN: CPT | Performed by: INTERNAL MEDICINE

## 2018-01-30 RX ORDER — VALACYCLOVIR HYDROCHLORIDE 1 G/1
TABLET, FILM COATED ORAL
Refills: 0 | COMMUNITY
Start: 2018-01-26 | End: 2019-02-22

## 2018-01-30 NOTE — MR AVS SNAPSHOT
After Visit Summary   1/30/2018    Ayanna Brannon    MRN: [id number]           Patient Information     Date Of Birth          1950        Visit Information        Provider Department      1/30/2018 3:00 PM Sarah Ivory MD [facility]        Today's Diagnoses     Herpes zoster without complication    -  1      Care Instructions    You can use a mild soap and water to wash.    Wash your clothes in hot water.      [facility]    If you have any questions regarding to your visit please contact your care team:     Team Pink:   Clinic Hours Telephone Number   Internal Medicine:  Dr. Sarah Parker, NP       7am-7pm  Monday - Thursday   7am-5pm  Fridays  [phone]  (Appointment scheduling available 24/7)    Questions about your visit?  Team Line  [phone]   Urgent Care - [place] [facility] - 11am-9pm Monday-Friday Saturday-Sunday- 9am-5pm   [place] - 5pm-9pm Monday-Friday Saturday-Sunday- 9am-5pm  [phone] - [facility]  [phone] - [place]       What options do I have for visits at the clinic other than the traditional office visit?  To expand how we care for you, many of our providers are utilizing electronic visits (e-visits) and telephone visits, when medically appropriate, for interactions with their patients rather than a visit in the clinic.   We also offer nurse visits for many medical concerns. Just like any other service, we will bill your insurance company for this type of visit based on time spent on the phone with your provider. Not all insurance companies cover these visits. Please check with your medical insurance if this type of visit is covered. You will be responsible for any charges that are not paid by your insurance.      E-visits via Wise Connect:  generally incur a $35.00 fee.  Telephone visits:  Time spent on the phone: *charged based on time that is spent on the phone in increments  "of 10 minutes. Estimated cost:   5-10 mins $30.00   11-20 mins. $59.00   21-30 mins. $85.00   Use OpenChimehart (secure email communication and access to your chart) to send your primary care provider a message or make an appointment. Ask someone on your Team how to sign up for OpenChimehart.    For a Price Quote for your services, please call our Lively Line at [phone].    As always, Thank you for trusting us with your health care needs!    Discharged by Tiffany FINE CMA ([facility])            Follow-ups after your visit        Who to contact     If you have questions or need follow up information about today's clinic visit or your schedule please contact [facility] directly at [phone].  Normal or non-critical lab and imaging results will be communicated to you by OpenChimehart, letter or phone within 4 business days after the clinic has received the results. If you do not hear from us within 7 days, please contact the clinic through KP Corpt or phone. If you have a critical or abnormal lab result, we will notify you by phone as soon as possible.  Submit refill requests through Numira Biosciences or call your pharmacy and they will forward the refill request to us. Please allow 3 business days for your refill to be completed.          Additional Information About Your Visit        OpenChimehart Information     [employer] gives you secure access to your electronic health record. If you see a primary care provider, you can also send messages to your care team and make appointments. If you have questions, please call your primary care clinic.  If you do not have a primary care provider, please call [phone] and they will assist you.        Care EveryWhere ID     This is your Care EveryWhere ID. This could be used by other organizations to access your [place] medical records  [id number]        Your Vitals Were     Pulse Respirations Height Pulse Oximetry BMI (Body Mass Index)       89 16 5' 7.5\" (1.715 m) 94% 30.4 kg/m2        " Blood Pressure from Last 3 Encounters:   01/30/18 112/72   06/30/17 126/82   10/05/16 122/82    Weight from Last 3 Encounters:   01/30/18 197 lb (89.4 kg)   06/30/17 195 lb 9.6 oz (88.7 kg)   10/05/16 189 lb 3.2 oz (85.8 kg)              Today, you had the following     No orders found for display         Today's Medication Changes          These changes are accurate as of 1/30/18  4:05 PM.  If you have any questions, ask your nurse or doctor.               These medicines have changed or have updated prescriptions.        Dose/Directions    metoprolol succinate 25 MG 24 hr tablet   Commonly known as:  TOPROL-XL   This may have changed:  Another medication with the same name was removed. Continue taking this medication, and follow the directions you see here.   Used for:  Essential hypertension   Changed by:  Sarah Ivory MD        TAKE 1 TABLET(25 MG) BY MOUTH TWICE DAILY   Quantity:  180 tablet   Refills:  2                Primary Care Provider Office Phone # Fax #    Sarah Ivory [phone] [address] [facility] [address]        Equal Access to Services     [facility]: Hadii diana conde hadasho Sochandlerali, waaxda luqadaha, qaybta kaalmada adeegyada, madi moreau . So [facility] [phone].    ATENCIÓN: Si habla español, tiene a quezada disposición servicios gratuitos de asistencia lingüística. Llame al [phone].    We comply with applicable federal civil rights laws and [place] laws. We do not discriminate on the basis of race, color, national origin, age, disability, sex, sexual orientation, or gender identity.            Thank you!     Thank you for choosing [facility]  for your care. Our goal is always to provide you with excellent care. Hearing back from our patients is one way we can continue to improve our services. Please take a few minutes to complete the written survey that you may receive in the mail after your visit with us.  Thank you!             Your Updated Medication List - Protect others around you: Learn how to safely use, store and throw away your medicines at www.disposemymeds.org.          This list is accurate as of 1/30/18  4:05 PM.  Always use your most recent med list.                   Brand Name Dispense Instructions for use Diagnosis    ASPIRIN LOW STRENGTH 81 MG chewable tablet   Generic drug:  aspirin      1 TABLET DAILY        metoprolol succinate 25 MG 24 hr tablet    TOPROL-XL    180 tablet    TAKE 1 TABLET(25 MG) BY MOUTH TWICE DAILY    Essential hypertension       Multi-vitamin Tabs tablet      Take 1 tablet by mouth daily        valACYclovir 1000 mg tablet    VALTREX     TK 1 T PO TID FOR 7 DAYS

## 2018-01-30 NOTE — PROGRESS NOTES
INTERNAL MEDICINE  SUBJECTIVE:   Ayanna Brannon is a 67 year old female who presents to clinic today for the following health issues:    Patient presents to clinic today for shingles.    She fell a week or so ago on her back but it did not hurt when she fell on Thursday. By Sunday night her  put Biofreeze on her and she had a rash the next day. She thought she was allergic to Biofreeze. She was not seen until the Friday after. She was diagnosed for shingles by [facility]. It is across her back and the pain is not severe. She has been taking Valtrex and one Aleve at night and a couple ibuprofen during the day. Does not think she needs anything else for pain. If she sits straight she feels fine but if she slouches it hurts. She rates the pain as a 3 or 4. The first week she thought she had cracked a rib because it was so painful to sleep. She even went to a chiropractor who told her not to lay on her stomach. She has been able to sleep for the past few days.       Problem list and histories reviewed & adjusted, as indicated.  Additional history: as documented    Labs reviewed in EPIC    Reviewed and updated as needed this visit by clinical staff  Tobacco  Allergies  Meds  Med Hx  Surg Hx  Fam Hx  Soc Hx      Reviewed and updated as needed this visit by Provider         ROS:  C: NEGATIVE for fever, chills, change in weight  I: NEGATIVE for worrisome moles or lesions, POSITIVE for shingles  M: NEGATIVE for significant arthralgias or myalgia  N: NEGATIVE for weakness, dizziness or paresthesias  P: NEGATIVE for changes in mood or affect    This document serves as a record of the services and decisions personally performed and made by Sarah Ivory MD. It was created on his/her behalf by Jennifer Thomas trained medical scribe. The creation of this document is based the provider's statements to the medical scribes.    Mendoza Thomas 3:50 PM, January 30, 2018    OBJECTIVE:   /72  Pulse 89  Resp 16  Ht 1.715  "m (5' 7.5\")  Wt 89.4 kg (197 lb)  SpO2 94%  BMI 30.4 kg/m2  Body mass index is 30.4 kg/(m^2).  GENERAL: healthy, alert and no distress  SKIN: Left flank vesicular erythematous rash that stops at the midline  NEURO: Normal strength and tone, mentation intact and speech normal  PSYCH: mentation appears normal, affect normal/bright    Diagnostic Test Results:  Results for orders placed or performed in visit on 08/01/17   **Basic metabolic panel FUTURE anytime   Result Value Ref Range    Sodium 140 133 - 144 mmol/L    Potassium 3.8 3.4 - 5.3 mmol/L    Chloride 106 94 - 109 mmol/L    Carbon Dioxide 20 20 - 32 mmol/L    Anion Gap 14 3 - 14 mmol/L    Glucose 214 (H) 70 - 99 mg/dL    Urea Nitrogen 20 7 - 30 mg/dL    Creatinine 0.75 0.52 - 1.04 mg/dL    GFR Estimate 78 >60 mL/min/1.7m2    GFR Estimate If Black >90   GFR Calc   >60 mL/min/1.7m2    Calcium 9.0 8.5 - 10.1 mg/dL       ASSESSMENT/PLAN:   1. Herpes zoster without complication  Patient will finish her Valtrex treatment and was told to keep the area clean and covered.  Shingrix in 6 months.      Patient Instructions   You can use a mild soap and water to wash.    Wash your clothes in hot water.      The information in this document, created by a scribe for me, accurately reflects the services I personally performed and the decisions made by me. I have reviewed and approved this document for accuracy.     I spent 14 minutes of time with the patient and >50% of it was in education and counseling regarding shingles.    Sarah Ivory MD  [facility]    Start 3:50 PM  End 4:04 PM    "

## 2018-01-30 NOTE — PATIENT INSTRUCTIONS
You can use a mild soap and water to wash.    Wash your clothes in hot water.      [facility]    If you have any questions regarding to your visit please contact your care team:     Team Pink:   Clinic Hours Telephone Number   Internal Medicine:  Dr. Sarah Parker NP       7am-7pm  Monday - Thursday   7am-5pm  Fridays  [phone]  (Appointment scheduling available 24/7)    Questions about your visit?  Team Line  [phone]   Urgent Care - [place] - 11am-9pm Monday-Friday Saturday-Sunday- 9am-5pm   [place] - 5pm-9pm Monday-Friday Saturday-Sunday- 9am-5pm  [phone] - [facility]  [phone] - [place]       What options do I have for visits at the clinic other than the traditional office visit?  To expand how we care for you, many of our providers are utilizing electronic visits (e-visits) and telephone visits, when medically appropriate, for interactions with their patients rather than a visit in the clinic.   We also offer nurse visits for many medical concerns. Just like any other service, we will bill your insurance company for this type of visit based on time spent on the phone with your provider. Not all insurance companies cover these visits. Please check with your medical insurance if this type of visit is covered. You will be responsible for any charges that are not paid by your insurance.      E-visits via agencyQ:  generally incur a $35.00 fee.  Telephone visits:  Time spent on the phone: *charged based on time that is spent on the phone in increments of 10 minutes. Estimated cost:   5-10 mins $30.00   11-20 mins. $59.00   21-30 mins. $85.00   Use KlickSportst (secure email communication and access to your chart) to send your primary care provider a message or make an appointment. Ask someone on your Team how to sign up for agencyQ.    For a Price Quote for your services, please call our Consumer Price Line at  [phone].    As always, Thank you for trusting us with your health care needs!    Discharged by Tiffany FINE CMA ([facility])

## 2018-06-18 DIAGNOSIS — I10 ESSENTIAL HYPERTENSION: ICD-10-CM

## 2018-06-18 RX ORDER — METOPROLOL SUCCINATE 25 MG/1
TABLET, EXTENDED RELEASE ORAL
Qty: 180 TABLET | Refills: 2 | Status: SHIPPED | OUTPATIENT
Start: 2018-06-18 | End: 2018-12-14

## 2018-08-21 ENCOUNTER — TRANSFERRED RECORDS (OUTPATIENT)
Dept: HEALTH INFORMATION MANAGEMENT | Facility: CLINIC | Age: 68
End: 2018-08-21

## 2018-08-21 LAB — HEMOCCULT STL QL IA: NEGATIVE

## 2018-12-14 DIAGNOSIS — I10 ESSENTIAL HYPERTENSION: ICD-10-CM

## 2018-12-14 RX ORDER — METOPROLOL SUCCINATE 25 MG/1
TABLET, EXTENDED RELEASE ORAL
Qty: 180 TABLET | Refills: 0 | Status: SHIPPED | OUTPATIENT
Start: 2018-12-14 | End: 2018-12-31

## 2018-12-14 NOTE — TELEPHONE ENCOUNTER
Reason for Call:  Medication or medication refill:    Do you use a [facility]?  Name of the pharmacy and phone number for the current request:  physical copy of [facility]   Or        [id number] - [place], [place] - [address]  [address]  [place] [address]  Phone: [phone] Fax: [phone]          Name of the medication requested: metoprolol succinate (TOPROL-XL) 25 MG 24 hr tablet       Other request: Patient would like a year of prescription     Can we leave a detailed message on this number? YES    Phone number patient can be reached at: Other phone number: [phone]      Best Time: any     Call taken on 12/14/2018 at 9:46 AM by Rafat Rasmussen

## 2018-12-14 NOTE — TELEPHONE ENCOUNTER
"Routing refill request to provider for review/approval because:  To advise on 1 year supply of medication as patient requests. Per [facility] protocol can only fill for 3 months.       Requested Prescriptions   Pending Prescriptions Disp Refills     metoprolol succinate ER (TOPROL-XL) 25 MG 24 hr tablet  Last Written Prescription Date:  6/18/18  Last Fill Quantity: 180,  # refills: 2   Last office visit: 6/30/2017 with prescribing provider:       Future Office Visit:   Next 5 appointments (look out 90 days)    Feb 22, 2019 11:00 AM CST  PHYSICAL with Sarah Ivory MD  [facility] [address] [address]  [phone]          180 tablet 2    Beta-Blockers Protocol Passed - 12/14/2018  1:40 PM       Passed - Blood pressure under 140/90 in past 12 months    BP Readings from Last 3 Encounters:   01/30/18 112/72   06/30/17 126/82   10/05/16 122/82                Passed - Patient is age 6 or older       Passed - Recent (12 mo) or future (30 days) visit within the authorizing provider's specialty    Patient had office visit in the last 12 months or has a visit in the next 30 days with authorizing provider or within the authorizing provider's specialty.  See \"Patient Info\" tab in inbasket, or \"Choose Columns\" in Meds & Orders section of the refill encounter.              Gudelia Gomez RN - BC      "

## 2018-12-31 ENCOUNTER — TELEPHONE (OUTPATIENT)
Dept: FAMILY MEDICINE | Facility: CLINIC | Age: 68
End: 2018-12-31

## 2018-12-31 DIAGNOSIS — I10 ESSENTIAL HYPERTENSION: ICD-10-CM

## 2018-12-31 RX ORDER — METOPROLOL SUCCINATE 25 MG/1
TABLET, EXTENDED RELEASE ORAL
Qty: 180 TABLET | Refills: 0 | Status: SHIPPED | OUTPATIENT
Start: 2018-12-31 | End: 2019-02-22

## 2018-12-31 NOTE — TELEPHONE ENCOUNTER
Noted in epic that patient has been on extended release BID for several years now.  Please advise if ok to continue with extended release    We cannot approve a 1 year supply  Will be 1 year since she was seen on January 30th.  Further refills will be discussed at OV  See 12/29/18 mychart encounter    Esperanza Shields RN

## 2018-12-31 NOTE — TELEPHONE ENCOUNTER
Reason for call:  Other   Patient called regarding (reason for call): call back and prescription  Additional comments: [facility] pharmacy calling needs a clarification of a med. Medication is Metoprolol 25mg. Is this suppose to be extended release, patient also wondering if it was suppose to be a 90 day supply or if she asked for a full year supply.     Phone number to reach patient:  Other phone number:  [phone]    Best Time:  any    Can we leave a detailed message on this number?  YES

## 2019-01-21 ENCOUNTER — TELEPHONE (OUTPATIENT)
Dept: FAMILY MEDICINE | Facility: CLINIC | Age: 69
End: 2019-01-21

## 2019-01-21 DIAGNOSIS — I10 ESSENTIAL HYPERTENSION: ICD-10-CM

## 2019-01-21 NOTE — TELEPHONE ENCOUNTER
Patient has always been getting the Metoprolol Succinate (24 hour tab) prescribed since 2010  Called [facility] and they stated that patient has never picked up Metoprolol from them so they were unable to confirm what patient has been getting previously  Called patient and explained to her that we have always prescribed Metoprolol Succinate (24 hour tab) BID  She verified that she has not been getting this prescribed elsewhere  She just assumed that since she was taking this BID, that it was the regular release and not the extended release  Advised her to continue same med without change and can discuss further at OV  She verbalized understanding    Esperanza Shields RN

## 2019-01-21 NOTE — TELEPHONE ENCOUNTER
Reason for call:  Med question  Patient called regarding (reason for call): prescription-Metoprolol er 25 1 tab twice daily. Patient has been on plain tab and the pharmacy wants the RX for the plain tab. They have sent 2 electronically and the ER was sent twice. The patient wants the regular. If the Dr is changing it, they are asking for someone to tell the patient    Phone number to reach patient: [phone]  Best Time: 10-7    Can we leave a detailed message on this number?  YES

## 2019-02-20 ASSESSMENT — ENCOUNTER SYMPTOMS
PALPITATIONS: 0
SHORTNESS OF BREATH: 0
DIARRHEA: 0
HEADACHES: 0
HEMATURIA: 0
CONSTIPATION: 0
DYSURIA: 0
FREQUENCY: 0
SORE THROAT: 0
DIZZINESS: 0
HEMATOCHEZIA: 1
NERVOUS/ANXIOUS: 0
BREAST MASS: 0
FEVER: 0
PARESTHESIAS: 0
CHILLS: 0
WEAKNESS: 0
JOINT SWELLING: 0
COUGH: 0
ARTHRALGIAS: 0
NAUSEA: 0
HEARTBURN: 0
MYALGIAS: 0
ABDOMINAL PAIN: 0

## 2019-02-20 ASSESSMENT — ACTIVITIES OF DAILY LIVING (ADL): CURRENT_FUNCTION: NO ASSISTANCE NEEDED

## 2019-02-22 ENCOUNTER — TELEPHONE (OUTPATIENT)
Dept: FAMILY MEDICINE | Facility: CLINIC | Age: 69
End: 2019-02-22

## 2019-02-22 ENCOUNTER — OFFICE VISIT (OUTPATIENT)
Dept: INTERNAL MEDICINE | Facility: CLINIC | Age: 69
End: 2019-02-22
Payer: COMMERCIAL

## 2019-02-22 VITALS
BODY MASS INDEX: 35.96 KG/M2 | TEMPERATURE: 97.3 F | RESPIRATION RATE: 16 BRPM | SYSTOLIC BLOOD PRESSURE: 130 MMHG | WEIGHT: 195.4 LBS | HEART RATE: 79 BPM | DIASTOLIC BLOOD PRESSURE: 76 MMHG | HEIGHT: 62 IN | OXYGEN SATURATION: 95 %

## 2019-02-22 DIAGNOSIS — E78.5 HYPERLIPIDEMIA LDL GOAL <130: ICD-10-CM

## 2019-02-22 DIAGNOSIS — E66.01 MORBID OBESITY DUE TO EXCESS CALORIES (H): ICD-10-CM

## 2019-02-22 DIAGNOSIS — I10 ESSENTIAL HYPERTENSION: ICD-10-CM

## 2019-02-22 DIAGNOSIS — Z00.00 ROUTINE GENERAL MEDICAL EXAMINATION AT A HEALTH CARE FACILITY: Primary | ICD-10-CM

## 2019-02-22 DIAGNOSIS — R73.01 IMPAIRED FASTING GLUCOSE: ICD-10-CM

## 2019-02-22 DIAGNOSIS — K62.5 RECTAL BLEEDING: ICD-10-CM

## 2019-02-22 DIAGNOSIS — I10 HYPERTENSION GOAL BP (BLOOD PRESSURE) < 140/90: ICD-10-CM

## 2019-02-22 DIAGNOSIS — Z12.31 VISIT FOR SCREENING MAMMOGRAM: ICD-10-CM

## 2019-02-22 PROCEDURE — G0438 PPPS, INITIAL VISIT: HCPCS | Performed by: INTERNAL MEDICINE

## 2019-02-22 RX ORDER — METOPROLOL SUCCINATE 25 MG/1
TABLET, EXTENDED RELEASE ORAL
Qty: 60 TABLET | Refills: 0 | Status: CANCELLED | OUTPATIENT
Start: 2019-02-22

## 2019-02-22 RX ORDER — METOPROLOL SUCCINATE 25 MG/1
TABLET, EXTENDED RELEASE ORAL
Qty: 180 TABLET | Refills: 11 | Status: SHIPPED | OUTPATIENT
Start: 2019-02-22 | End: 2019-02-22

## 2019-02-22 RX ORDER — METOPROLOL SUCCINATE 25 MG/1
TABLET, EXTENDED RELEASE ORAL
Qty: 30 TABLET | Refills: 0 | Status: SHIPPED | OUTPATIENT
Start: 2019-02-22 | End: 2019-02-22

## 2019-02-22 RX ORDER — METOPROLOL SUCCINATE 25 MG/1
TABLET, EXTENDED RELEASE ORAL
Qty: 60 TABLET | Refills: 0 | Status: SHIPPED | OUTPATIENT
Start: 2019-02-22 | End: 2019-03-04

## 2019-02-22 ASSESSMENT — ENCOUNTER SYMPTOMS
NAUSEA: 0
FREQUENCY: 0
DYSURIA: 0
CONSTIPATION: 0
ABDOMINAL PAIN: 0
SHORTNESS OF BREATH: 0
HEARTBURN: 0
DIARRHEA: 0
NERVOUS/ANXIOUS: 0
ARTHRALGIAS: 0
HEMATOCHEZIA: 1
EYE PAIN: 0
SORE THROAT: 0
HEADACHES: 0
DIZZINESS: 0
FEVER: 0
PARESTHESIAS: 0
WEAKNESS: 0
HEMATURIA: 0
PALPITATIONS: 0
BREAST MASS: 0
JOINT SWELLING: 0
CHILLS: 0
MYALGIAS: 0
COUGH: 0

## 2019-02-22 ASSESSMENT — ACTIVITIES OF DAILY LIVING (ADL): CURRENT_FUNCTION: NO ASSISTANCE NEEDED

## 2019-02-22 ASSESSMENT — MIFFLIN-ST. JEOR: SCORE: 1365.33

## 2019-02-22 ASSESSMENT — PAIN SCALES - GENERAL: PAINLEVEL: NO PAIN (0)

## 2019-02-22 NOTE — TELEPHONE ENCOUNTER
Patient wants medication to be sent to [facility]. She only wants a one month supply sent to [facility] on [address] until she gets the [facility] mail service. She will call back to pink team and let them know what [facility] mail service she wants the rest of her medication to be sent too.    Leia Ray CMA on 2/22/2019 at 11:57 AM

## 2019-02-22 NOTE — PROGRESS NOTES
"SUBJECTIVE:   Ayanna Brannon is a 69 year old female who presents for Preventive Visit.  Are you in the first 12 months of your Medicare coverage?  No    Annual Wellness Visit     In general, how would you rate your overall health?  Good    Frequency of exercise:  1 day/week    Do you usually eat at least 4 servings of fruit and vegetables a day, include whole grains    & fiber and avoid regularly eating high fat or \"junk\" foods?  No    Taking medications regularly:  Yes    Medication side effects:  None    Ability to successfully perform activities of daily living:  No assistance needed    Home Safety:  No safety concerns identified    Hearing Impairment:  Difficulty following a conversation in a noisy restaurant or crowded room and need to ask people to speak up or repeat themselves    In the past 6 months, have you been bothered by leaking of urine? Yes    In general, how would you rate your overall mental or emotional health?  Good    PHQ-2 Total Score: 0    Additional concerns today:  No    Do you feel safe in your environment? Yes    Do you have a Health Care Directive? No: Advance care planning reviewed with patient; information given to patient to review.    Fall risk  Fallen 2 or more times in the past year?: No  Any fall with injury in the past year?: No    Cognitive Screening   1) Repeat 3 items (Leader, Season, Table)    2) Clock draw: NORMAL  3) 3 item recall: Recalls 3 objects  Results: 3 items recalled: COGNITIVE IMPAIRMENT LESS LIKELY    Mini-CogTM Copyright S Donell. Licensed by the author for use in [facility]; reprinted with permission (judi@.[facility]). All rights reserved.      Do you have sleep apnea, excessive snoring or daytime drowsiness?: no    Reviewed and updated as needed this visit by clinical staff  Tobacco  Allergies  Meds  Problems  Med Hx  Surg Hx  Fam Hx       Reviewed and updated as needed this visit by Provider  Tobacco  Allergies  Meds  Problems  Med Hx  Surg " Hx  Fam Hx        Social History     Tobacco Use     Smoking status: Never Smoker     Smokeless tobacco: Never Used   Substance Use Topics     Alcohol use: Yes     Comment: seldom     Alcohol Use 2/20/2019   If you drink alcohol do you typically have greater than 3 drinks per day OR greater than 7 drinks per week? No     Current providers sharing in care for this patient include:   Patient Care Team:  Sarah Ivory MD as PCP - General  Sarah Ivory MD as PCP - Assigned PCP    The following health maintenance items are reviewed in Epic and correct as of today:  Health Maintenance   Topic Date Due     ADVANCE DIRECTIVE PLANNING Q5 YRS  11/11/2016     FALL RISK ASSESSMENT  07/18/2017     MEDICARE ANNUAL WELLNESS VISIT  10/05/2017     INFLUENZA VACCINE (1) 09/01/2018     MAMMO SCREEN Q2 YR (SYSTEM ASSIGNED)  10/05/2018     PHQ-2 Q1 YR  02/22/2020     LIPID SCREEN Q5 YR FEMALE (SYSTEM ASSIGNED)  09/29/2021     DTAP/TDAP/TD IMMUNIZATION (3 - Td) 11/16/2021     COLONOSCOPY Q10 YR  07/30/2025     DEXA SCAN SCREENING (SYSTEM ASSIGNED)  Addressed     HEPATITIS C SCREENING  Addressed     IPV IMMUNIZATION  Aged Out     MENINGITIS IMMUNIZATION  Aged Out     Labs reviewed in EPIC  Pneumonia Vaccine: Due for PPSV23  Mammogram Screening: Mammogram Screening: Patient over age 50, mutual decision to screen reflected in health maintenance.    Review of Systems   Constitutional: Negative for chills and fever.   HENT: Negative for congestion, dental problem, ear pain, hearing loss and sore throat.    Eyes: Negative for pain and visual disturbance.   Respiratory: Negative for cough and shortness of breath.    Cardiovascular: Negative for chest pain, palpitations and peripheral edema.   Gastrointestinal: Positive for hematochezia. Negative for abdominal pain, constipation, diarrhea, heartburn and nausea.   Breasts:  Negative for tenderness, breast mass and discharge.   Genitourinary: Negative for dysuria, frequency, genital  "sores, hematuria, pelvic pain, urgency, vaginal bleeding and vaginal discharge.   Musculoskeletal: Negative for arthralgias, joint swelling and myalgias.   Skin: Negative for rash.   Neurological: Negative for dizziness, weakness, headaches and paresthesias.   Psychiatric/Behavioral: Negative for mood changes. The patient is not nervous/anxious.      - Suspects that the blood in her stool is from hemorrhoids.   -   this fall of throat cancer, complications from chemotherapy, went into total organ failure.   - Hasn't gotten back to the gym since her 's death.  - She is taking care of her granddaughters during the day. Lives with her daughter and her family.     This document serves as a record of the services and decisions personally performed and made by Sarah Ivory MD. It was created on her behalf by Disha Mcdaniel, a trained medical scribe. The creation of this document is based the provider's statements to the medical scribe.  Disha Mcdaniel, 2019 11:19 AM     OBJECTIVE:   /76   Pulse 79   Temp 97.3  F (36.3  C) (Oral)   Resp 16   Ht 1.568 m (5' 1.73\")   Wt 88.6 kg (195 lb 6.4 oz)   SpO2 95%   BMI 36.05 kg/m   Estimated body mass index is 36.05 kg/m  as calculated from the following:    Height as of this encounter: 1.568 m (5' 1.73\").    Weight as of this encounter: 88.6 kg (195 lb 6.4 oz).  Physical Exam  GENERAL APPEARANCE: healthy, alert and no distress  EYES: Eyes grossly normal to inspection, PERRL and conjunctivae and sclerae normal  HENT: ear canals and TM's normal, nose and mouth without ulcers or lesions, oropharynx clear and oral mucous membranes moist  NECK: no adenopathy, no asymmetry, masses, or scars and thyroid normal to palpation  RESP: lungs clear to auscultation - no rales, rhonchi or wheezes  BREAST: normal without masses, tenderness or nipple discharge and no palpable axillary masses or adenopathy  CV: regular rate and rhythm, normal S1 S2, no S3 or S4, no " murmur, click or rub, no peripheral edema and peripheral pulses strong  ABDOMEN: soft, nontender, no hepatosplenomegaly, no masses and bowel sounds normal   (female): normal female external genitalia, normal urethral meatus, no vaginal mucosal atrophy noted  RECTAL: non-thrombosed external hemorrhoids.   MS: no musculoskeletal defects are noted and gait is age appropriate without ataxia  SKIN: no suspicious lesions or rashes  NEURO: Normal strength and tone, sensory exam grossly normal, mentation intact and speech normal  PSYCH: mentation appears normal and affect normal/bright with moment of tearfulness    Diagnostic Test Results:  No results found for this or any previous visit (from the past 24 hour(s)).    ASSESSMENT / PLAN:   1. Routine general medical examination at a health care facility  Negative screening exam; up-to-date on preventive services.    2. Visit for screening mammogram  Due for mammogram this year.   - MA SCREENING DIGITAL BILAT - Future  (s+30); Future    3. BMI 36.0-36.9,adult  Recommended she get back in to the gym for her exercise classes.     4. Hypertension goal BP (blood pressure) < 140/90  Well controlled. Pt doing well on current medication and treatment plan. No change at this time.    5. Impaired fasting glucose  Stable.  Weight loss encouraged.     6. Hyperlipidemia LDL goal <130  Last check in 2016. Above desirable at that time.     7. Essential hypertension  Well controlled. Pt doing well on current medication and treatment plan. No change at this time. Refills given.   - metoprolol succinate ER (TOPROL-XL) 25 MG 24 hr tablet; TAKE 1 TABLET(25 MG) BY MOUTH TWICE DAILY  Dispense: 180 tablet; Refill: 11    8. Rectal bleedingPt has external hemorrhoids,  Declines further workup with a colonoscopy since the bleeding has been the same since her last colonoscopy and was determined to be from hemorrhoids.  Will do a cologuard later this year.      End of Life Planning:  Patient  "currently has an advanced directive: No.  I have verified the patient's ablity to prepare an advanced directive/make health care decisions.  Literature was provided to assist patient in preparing an advanced directive.    COUNSELING:  Reviewed preventive health counseling, as reflected in patient instructions       Regular exercise       Immunizations    Declined: Influenza    Recommended Pneumonia 23 and Shingrix in future.            Colon cancer screening    BP Readings from Last 1 Encounters:   02/22/19 130/76     Estimated body mass index is 36.05 kg/m  as calculated from the following:    Height as of this encounter: 1.568 m (5' 1.73\").    Weight as of this encounter: 88.6 kg (195 lb 6.4 oz).    Weight management plan: Discussed healthy diet and exercise guidelines     reports that  has never smoked. she has never used smokeless tobacco.    Appropriate preventive services were discussed with this patient, including applicable screening as appropriate for cardiovascular disease, diabetes, osteopenia/osteoporosis, and glaucoma.  As appropriate for age/gender, discussed screening for colorectal cancer, prostate cancer, breast cancer, and cervical cancer. Checklist reviewing preventive services available has been given to the patient.    Reviewed patients plan of care and provided an AVS. The Basic Care Plan (routine screening as documented in Health Maintenance) for Ayanna meets the Care Plan requirement. This Care Plan has been established and reviewed with the Patient.    Counseling Resources:  ATP IV Guidelines  Pooled Cohorts Equation Calculator  Breast Cancer Risk Calculator  FRAX Risk Assessment  ICSI Preventive Guidelines  Dietary Guidelines for Americans, 2010  USDA's MyPlate  ASA Prophylaxis  Lung CA Screening    The information in this document, created by the medical scribe for me, accurately reflects the services I personally performed and the decisions made by me. I have reviewed and approved this " document for accuracy.     Sarah Ivory MD  [facility]    Patient Instructions   You can have the Cologuard test in August 2019. You need to go through their website to order the test.     Consider going to a grief support group.     Schedule your mammogram and fasting labs.

## 2019-02-22 NOTE — TELEPHONE ENCOUNTER
Resent for 1 month supply to local [facility]    Please send 90 day supply with refills to mail order pharmacy once patient verifies pharmacy info    Esperanza Shields RN

## 2019-02-22 NOTE — PATIENT INSTRUCTIONS
You can have the Cologuard test in August 2019. You need to go through their website to order the test.     Consider going to a grief support group.     Schedule your mammogram and fasting labs.

## 2019-02-27 NOTE — TELEPHONE ENCOUNTER
Patient has been informed and will need a new prescription but she is unsure of which pharmacy. Patient will have pharmacy fax request. Arabella Claros MA

## 2019-03-04 DIAGNOSIS — I10 ESSENTIAL HYPERTENSION: ICD-10-CM

## 2019-03-04 RX ORDER — METOPROLOL SUCCINATE 25 MG/1
TABLET, EXTENDED RELEASE ORAL
Qty: 180 TABLET | Refills: 2 | Status: SHIPPED | OUTPATIENT
Start: 2019-03-04 | End: 2019-11-05

## 2019-03-04 NOTE — TELEPHONE ENCOUNTER
Requested Prescriptions   Pending Prescriptions Disp Refills     metoprolol succinate ER (TOPROL-XL) 25 MG 24 hr tablet 60 tablet 0     Sig: TAKE 1 TABLET(25 MG) BY MOUTH TWICE DAILY    There is no refill protocol information for this order

## 2019-07-23 ENCOUNTER — TELEPHONE (OUTPATIENT)
Dept: INTERNAL MEDICINE | Facility: CLINIC | Age: 69
End: 2019-07-23

## 2019-07-23 DIAGNOSIS — E78.5 HYPERLIPIDEMIA LDL GOAL <130: ICD-10-CM

## 2019-07-23 DIAGNOSIS — R73.01 IMPAIRED FASTING GLUCOSE: ICD-10-CM

## 2019-07-23 DIAGNOSIS — I10 ESSENTIAL HYPERTENSION: ICD-10-CM

## 2019-07-23 LAB
ALT SERPL W P-5'-P-CCNC: 43 U/L (ref 0–50)
ANION GAP SERPL CALCULATED.3IONS-SCNC: 6 MMOL/L (ref 3–14)
BUN SERPL-MCNC: 19 MG/DL (ref 7–30)
CALCIUM SERPL-MCNC: 8.9 MG/DL (ref 8.5–10.1)
CHLORIDE SERPL-SCNC: 106 MMOL/L (ref 94–109)
CHOLEST SERPL-MCNC: 196 MG/DL
CO2 SERPL-SCNC: 26 MMOL/L (ref 20–32)
CREAT SERPL-MCNC: 0.67 MG/DL (ref 0.52–1.04)
GFR SERPL CREATININE-BSD FRML MDRD: 90 ML/MIN/{1.73_M2}
GLUCOSE SERPL-MCNC: 133 MG/DL (ref 70–99)
HBA1C MFR BLD: 6.9 % (ref 0–5.6)
HDLC SERPL-MCNC: 38 MG/DL
LDLC SERPL CALC-MCNC: 99 MG/DL
NONHDLC SERPL-MCNC: 158 MG/DL
POTASSIUM SERPL-SCNC: 4.1 MMOL/L (ref 3.4–5.3)
SODIUM SERPL-SCNC: 138 MMOL/L (ref 133–144)
TRIGL SERPL-MCNC: 293 MG/DL
TSH SERPL DL<=0.005 MIU/L-ACNC: 2.46 MU/L (ref 0.4–4)

## 2019-07-23 PROCEDURE — 84443 ASSAY THYROID STIM HORMONE: CPT | Performed by: INTERNAL MEDICINE

## 2019-07-23 PROCEDURE — 83036 HEMOGLOBIN GLYCOSYLATED A1C: CPT | Performed by: INTERNAL MEDICINE

## 2019-07-23 PROCEDURE — 36415 COLL VENOUS BLD VENIPUNCTURE: CPT | Performed by: INTERNAL MEDICINE

## 2019-07-23 PROCEDURE — 80061 LIPID PANEL: CPT | Performed by: INTERNAL MEDICINE

## 2019-07-23 PROCEDURE — 80048 BASIC METABOLIC PNL TOTAL CA: CPT | Performed by: INTERNAL MEDICINE

## 2019-07-23 PROCEDURE — 84460 ALANINE AMINO (ALT) (SGPT): CPT | Performed by: INTERNAL MEDICINE

## 2019-07-23 NOTE — RESULT ENCOUNTER NOTE
Call-  Blood sugar is now in the diabetic range.  Please make an appointment to discuss this further (any time in the next 2-3 weeks).

## 2019-07-23 NOTE — TELEPHONE ENCOUNTER
Notes recorded by Keke Vaz RN on 7/23/2019 at 3:29 PM CDT  Left message for patient to call RN hotline [phone].   See TE.     Keke Vaz RN  ------    Notes recorded by Sarah Ivory MD on 7/23/2019 at 1:51 PM CDT  Call-  Blood sugar is now in the diabetic range.  Please make an appointment to discuss this further (any time in the next 2-3 weeks).    Keke Vaz RN

## 2019-07-24 NOTE — RESULT ENCOUNTER NOTE
Normal thyroid. Normal liver blood test. Normal electrolytes. Normal kidney function. Elevated cholesterol which we will discuss further in the office.   Dr. Ivory

## 2019-07-31 ENCOUNTER — TELEPHONE (OUTPATIENT)
Dept: FAMILY MEDICINE | Facility: CLINIC | Age: 69
End: 2019-07-31

## 2019-07-31 NOTE — TELEPHONE ENCOUNTER
Reason for call:  Other   Patient called regarding (reason for call): call back  Additional comments: Patient is calling because she said Dr. Ivory wants to see her for a Diabetic check due to her recent labs. Please call back, she wants to know if she can be squeezed in sooner.     Phone number to reach patient:  Cell number on file:    Telephone Information:   Mobile [phone]       Best Time:  any    Can we leave a detailed message on this number?  YES

## 2019-08-05 ENCOUNTER — OFFICE VISIT (OUTPATIENT)
Dept: INTERNAL MEDICINE | Facility: CLINIC | Age: 69
End: 2019-08-05
Payer: COMMERCIAL

## 2019-08-05 VITALS
TEMPERATURE: 98 F | HEIGHT: 62 IN | WEIGHT: 198.2 LBS | DIASTOLIC BLOOD PRESSURE: 78 MMHG | BODY MASS INDEX: 36.47 KG/M2 | HEART RATE: 91 BPM | SYSTOLIC BLOOD PRESSURE: 132 MMHG | RESPIRATION RATE: 18 BRPM | OXYGEN SATURATION: 95 %

## 2019-08-05 DIAGNOSIS — E11.9 TYPE 2 DIABETES MELLITUS WITHOUT COMPLICATION, WITHOUT LONG-TERM CURRENT USE OF INSULIN (H): Primary | ICD-10-CM

## 2019-08-05 LAB
CREAT UR-MCNC: 52 MG/DL
MICROALBUMIN UR-MCNC: <5 MG/L
MICROALBUMIN/CREAT UR: NORMAL MG/G CR (ref 0–25)

## 2019-08-05 PROCEDURE — 99207 C PAF COMPLETED  NO CHARGE: CPT | Performed by: INTERNAL MEDICINE

## 2019-08-05 PROCEDURE — 82043 UR ALBUMIN QUANTITATIVE: CPT | Performed by: INTERNAL MEDICINE

## 2019-08-05 PROCEDURE — 99214 OFFICE O/P EST MOD 30 MIN: CPT | Performed by: INTERNAL MEDICINE

## 2019-08-05 RX ORDER — ROSUVASTATIN CALCIUM 5 MG/1
5 TABLET, COATED ORAL DAILY
Qty: 90 TABLET | Refills: 1 | Status: SHIPPED | OUTPATIENT
Start: 2019-08-05 | End: 2019-11-05

## 2019-08-05 ASSESSMENT — PAIN SCALES - GENERAL: PAINLEVEL: NO PAIN (0)

## 2019-08-05 ASSESSMENT — MIFFLIN-ST. JEOR: SCORE: 1378.03

## 2019-08-05 NOTE — PROGRESS NOTES
Subjective     Ayanna Brannon is a 69 year old female who presents to clinic today for the following health issues:    HPI   Diabetes  Patient notes that she has been swimming a lot lately with the warm weather. Her A1c went up from 6.0 to 6.9 since her last A1c test. With her diet, she notes that it hasn't been good- it has been harder to eat for one. She feels down eating alone so she tries to eat with family members more. She has been cooking at home and eating veggies. She wondered about glucose drinks is she could substitute it for meals. She is considering a diabetic educator to help manage her diabetes. She is currently taking aspirin.      Patient Active Problem List   Diagnosis     Urinary incontinence     Hypertension goal BP (blood pressure) < 140/90     Colon polyps     Advanced directives, counseling/discussion     Impaired fasting glucose     Excess body and facial hair     Hyperlipidemia LDL goal <130     External hemorrhoids     Umbilical hernia with obstruction     Morbid obesity due to excess calories (H)     BMI 36.0-36.9,adult     Diabetes mellitus, type 2 (H)     Past Surgical History:   Procedure Laterality Date     BIOPSY  7/6/16    Uterine for post menoposal bleeding     COLONOSCOPY      polyps first time, clear second     GENITOURINARY SURGERY      Uterine for post menoposal bleeding     GYN SURGERY      Uterine for post menoposal bleeding     HYSTERECTOMY  09/2016       Social History     Tobacco Use     Smoking status: Never Smoker     Smokeless tobacco: Never Used   Substance Use Topics     Alcohol use: Yes     Comment: seldom     Family History   Problem Relation Age of Onset     Gynecology Mother      Heart Disease Father      Circulatory Maternal Grandfather      Genitourinary Problems Sister      Thyroid Disease Daughter      Coronary Artery Disease Paternal Grandfather      Breast Cancer Maternal Grandmother      Thyroid Disease Daughter          Current Outpatient Medications  "  Medication Sig Dispense Refill     ASPIRIN LOW STRENGTH 81 MG OR CHEW 1 TABLET DAILY       metoprolol succinate ER (TOPROL-XL) 25 MG 24 hr tablet TAKE 1 TABLET(25 MG) BY MOUTH TWICE DAILY 180 tablet 2     multivitamin, therapeutic with minerals (MULTI-VITAMIN) TABS Take 1 tablet by mouth daily       rosuvastatin (CRESTOR) 5 MG tablet Take 1 tablet (5 mg) by mouth daily 90 tablet 1     Allergies   Allergen Reactions     Nkda [No Known Drug Allergies]        Reviewed and updated as needed this visit by Provider  Tobacco  Allergies  Meds  Problems  Med Hx  Surg Hx  Fam Hx         Review of Systems   ROS COMP: Constitutional, HEENT, cardiovascular, pulmonary, GI, , musculoskeletal, neuro, skin, endocrine and psych systems are negative, except as otherwise noted.    This document serves as a record of the services and decisions personally performed and made by Sarah Ivory MD. It was created on her behalf by Regine Ray, a trained medical scribe. The creation of this document is based on the provider's statements to the medical scribe.  Regine Ray 2:19 PM August 5, 2019      Objective    /78 (BP Location: Left arm, Cuff Size: Adult Large)   Pulse 91   Temp 98  F (36.7  C) (Oral)   Resp 18   Ht 1.568 m (5' 1.73\")   Wt 89.9 kg (198 lb 3.2 oz)   SpO2 95%   BMI 36.57 kg/m    Body mass index is 36.57 kg/m .  Physical Exam   GENERAL: healthy, alert and no distress    NECK: no adenopathy, no asymmetry, masses, or scars and thyroid normal to palpation  RESP: lungs clear to auscultation - no rales, rhonchi or wheezes  CV: regular rate and rhythm, normal S1 S2, no S3 or S4, no murmur, click or rub, no peripheral edema and peripheral pulses strong  MS: no gross musculoskeletal defects noted, no edema  SKIN: no suspicious lesions or rashes  NEURO: Normal strength and tone, mentation intact and speech normal  PSYCH: mentation appears normal, affect normal/bright  Diabetic foot exam: normal DP and PT pulses, no " "trophic changes or ulcerative lesions, normal sensory exam and normal monofilament exam    Diagnostic Test Results:  Labs reviewed in Epic  No results found for this or any previous visit (from the past 24 hour(s)).        Assessment & Plan   1. Type 2 diabetes mellitus without complication, without long-term current use of insulin (H)  New diagnosis.  A1c went up from 6.0 to 6.9 since last A1c test- hasn't been well managed with diet.  Patient considers seeing a diabetic educator- referral put in today.  Diabetic eye exam order put in today.  Prescription for Crestor ordered today.  Labs for A1c, TSH and ALT ordered today.  Lipid panel ordered today.  Will continue current measures to monitor condition.  Follow-up in 3 months.  - DIABETES EDUCATOR REFERRAL  - rosuvastatin (CRESTOR) 5 MG tablet; Take 1 tablet (5 mg) by mouth daily  Dispense: 90 tablet; Refill: 1  - Albumin Random Urine Quantitative with Creat Ratio  - **A1C FUTURE anytime; Future  - **TSH with free T4 reflex FUTURE anytime; Future  - Lipid panel reflex to direct LDL Fasting; Future  - **ALT FUTURE anytime; Future  - OPTOMETRY REFERRAL    BMI:   Estimated body mass index is 36.05 kg/m  as calculated from the following:    Height as of 2/22/19: 1.568 m (5' 1.73\").    Weight as of 2/22/19: 88.6 kg (195 lb 6.4 oz).   Weight management plan: Discussed healthy diet and exercise guidelines      Patient Instructions   - Crestor one tablet a day. Call your insurance if they cover the medication.    Common Non-starchy Vegetables  The following is a list of common non-starchy vegetables:  Amaranth or Chinese spinach   Artichoke   Artichoke hearts   Asparagus   Baby corn   Bamboo shoots   Beans (green, wax, Italian)   Bean sprouts   Beets   [place] sprouts   Broccoli   Cabbage (green, bok nam, Chinese)   Carrots   Cauliflower   Celery   Chayote   Coleslaw (packaged, no dressing)   Cucumber   Daikon   Eggplant   Greens (ely, kale, mustard, turnip)   Hearts " of palm   Jicama   Kohlrabi   Leeks   Mushrooms   Okra   Onions   Pea pods   Peppers   Radishes   Rutabaga   Salad greens (chicory, endive, escarole, lettuce, francois, spinach, arugula, radicchio, watercress)   Sprouts   Squash (cushaw, summer, crookneck, spaghetti, zucchini)   Sugar snap peas   Swiss chard   Tomato   Turnips   Water chestnuts   Yard-long beans      The information in this document, created by the medical scribe for me, accurately reflects the services I personally performed and the decisions made by me. I have reviewed and approved this document for accuracy prior to leaving the patient care area.  August 5, 2019 2:45 PM    I spent 30 minutes of time with the patient and >50% of it was in education and counseling regarding diabetes, cholesterol, diet/exercise and health maintenance.    Sarah Ivory MD  [facility]

## 2019-08-05 NOTE — PATIENT INSTRUCTIONS
- Crestor one tablet a day. Call your insurance if they cover the medication.  - Schedule a diabetic eye exam.    Common Non-starchy Vegetables  The following is a list of common non-starchy vegetables:  Amaranth or Chinese spinach   Artichoke   Artichoke hearts   Asparagus   Baby corn   Bamboo shoots   Beans (green, wax, Italian)   Bean sprouts   Beets   [place] sprouts   Broccoli   Cabbage (green, bok nam, Chinese)   Carrots   Cauliflower   Celery   Chayote   Coleslaw (packaged, no dressing)   Cucumber   Daikon   Eggplant   Greens (ely, kale, mustard, turnip)   Hearts of palm   Jicama   Kohlrabi   Leeks   Mushrooms   Okra   Onions   Pea pods   Peppers   Radishes   Rutabaga   Salad greens (chicory, endive, escarole, lettuce, francois, spinach, arugula, radicchio, watercress)   Sprouts   Squash (cushaw, summer, crookneck, spaghetti, zucchini)   Sugar snap peas   Swiss chard   Tomato   Turnips   Water chestnuts   Yard-long beans

## 2019-08-06 ENCOUNTER — TELEPHONE (OUTPATIENT)
Dept: FAMILY MEDICINE | Facility: CLINIC | Age: 69
End: 2019-08-06

## 2019-08-06 NOTE — TELEPHONE ENCOUNTER
Diabetes Education Scheduling Outreach #1:    Call to patient to schedule. Left message with phone number to call to schedule.    Plan for 2nd outreach attempt within 1 week.    Africa Whitfield  [facility]  Diabetes and Nutrition Scheduling

## 2019-08-15 ENCOUNTER — ALLIED HEALTH/NURSE VISIT (OUTPATIENT)
Dept: EDUCATION SERVICES | Facility: CLINIC | Age: 69
End: 2019-08-15
Payer: COMMERCIAL

## 2019-08-15 DIAGNOSIS — E11.9 TYPE 2 DIABETES MELLITUS WITHOUT COMPLICATION, WITHOUT LONG-TERM CURRENT USE OF INSULIN (H): Primary | ICD-10-CM

## 2019-08-15 PROCEDURE — G0108 DIAB MANAGE TRN  PER INDIV: HCPCS

## 2019-08-15 NOTE — Clinical Note
Seen for diabetes education.  Hopefully she's able to cut back on the milk and sweets.  She also says spam is 2 times per month... Guess she ate it the 2 days before seeing me.Sol Raygoza MS, RD, LD, CDE

## 2019-08-15 NOTE — PATIENT INSTRUCTIONS
Breakfast- add in 1-2 eggs, focusing on more protein    Sodium- 2000mg goal per day or less    Aim for 45g carbohydrates per meal OR total 150-180g per day =10-12 carbohydrate servings    Cook book- Diabetes meals by the plate     Search for recipe ideas by likes, dislikes, specific foods, and cooking styles.  Https://www.diabetesfoodhub.org/      Nutrition information, how to cook, and simple recipes for fruits and vegetables.  Useful short videos about fruits and vegetables.  Great website if you are looking to try something new.  https://www.fruitsandLightwireiesmoreStageMarkers.org/fruit-vegetable-nutrition-database      Snack Ideas for your Meal Plan     Protein (1 serving = 6-8 grams of protein each)    Beef Jerky ( 2 pieces)    Beef Sticks, plain (1 stick)    Cheese/Cheese Stick (1 oz)    Chicken breast (1 oz)    Cottage cheese, low fat (1/4 cup)    Crab, Imitation (2 oz)    Deli Meat (2 oz)    Edamame, boiled (1/2 cup)    Egg, hardboiled (1)    Shrimp, small (10 pieces)     Turkey Breast (1 oz)    Tuna, canned in water (1 oz)    Fairlife Milk (1/2 cup)    Yogurt, Greek : Siggis, Oikos Triple Zero, Dannon Light and Fit, etc (6 oz)    Carb (1 carb choice/serving = 15 grams)     Apple (medium)    Applesauce, unsweetened (1/2 cup)    Apricots, dried (4 whole)    Banana, medium (1/2)    Bread, 1 slice     Cantaloupe/Melon (1 cup)    Crackers 4-6 (varies by brand)    Cherries (15)    Cranberries, Dried (2 tbsp)    Dark Chocolate (1 oz)    Dates, dried (2-3 pieces)    Fruit cocktail, in own juice (1/2 cup)    Granola Bar (vary by brand)    Grapes (1/2 cup)    Ice cream, slow churned/low fat (1/2 cup)    Kiwi (~2)    Mixed Berries (1 cup)    Orange (1 medium)    Peach (1 medium)    Pear (1/2 medium)    Plums (2)    Pomegranate (1 small)    Popcorn, stove popped (2 cups)    Pretzels (3/4 oz)    Pudding, sugar free (1/2 cup)    Raisins (2 Tbsp)    Rice Cake, (2)    Skim or 1% milk (1 cup)    Tortilla Chips (1 oz)    Yogurt  (greek or plain)   cup    Fat (1 serving = 100 calories)    Almonds (2 Tbsp)    Avocado (1/3 fruit OR 3 Tbsp)    Cashews (11 whole)    Cheese (1 oz)    Chocolate, dark (3/4 oz)    Coconut, unsweetened (1/3 c shredded)    Hummus (3 Tbsp)    Peanuts (20 pieces)    Peanut/Nut Butter (1 Tbsp)    Pecans (10 halves)    Pumpkin seeds, unshelled (2 Tbsp)    Salad dressing  (1-2 Tbsp)    Sunflower seeds (2 Tbsp)    Vegetable Dip (1-2 Tbsp)    Walnuts (8 halves)    Free Foods    Bell Peppers    Broccoli    Carrots     Cauliflower     Celery    Coffee, black (regular or decaf)    Cucumber    Gelatin, Sugar Free    Herbal Tea, unsweetened     Pea Pods    Pickles (high in sodium)     Popsicle, Sugar Free    Salsa (2 Tbsp)    Tomatoes    Combination Snacks    Apple + 1 Tbsp peanut butter (carbohydrate + fat)    Handful crackers + 1 oz cheese (carbohydrate + fat or protein)    Carrot sticks + Hummus (free food + fat)    1 piece of peanut butter toast (carbohydrate + fat)    Greek yogurt + 2 Tbsp sunflower seeds (carbohydrate + fat)    Hard-boiled egg +   cup grapes (protein + carbohydrate)    1 piece of avocado toast (carbohydrate + fat)    Cucumbers + dip (free food + fat)      [facility] for the [facility] Area:  For Your Diabetes Education and Nutrition Appointments Call:  [phone]   For Diabetes Education or Nutrition Related Questions:   Phone: [phone]  E-mail: [email or web address]  Fax: [phone]   If you need a medication refill please contact your pharmacy. Please allow 3 business days for your refills to be completed.    Instructions for emailing the Diabetes Educators    If you need to communicate a non-urgent message to a Diabetes Educator via email, please send to [email or web address].    Please follow the following email guidelines:    Subject line: Secure: your clinic name (example: Secure: [facility])  In the email please include: First name, middle initial,  last name and date of birth.    We will be in touch with you within one (1) business day.

## 2019-08-15 NOTE — PROGRESS NOTES
"Diabetes Self-Management Education & Support    Diabetes Education Self Management & Training    SUBJECTIVE/OBJECTIVE:  Presents for: Initial Assessment for new diagnosis  Accompanied by: Self  Diabetes education in the past 24mo: No  Focus of Visit: Diabetes Pathophysiology, Assistance w/ making life changes, Self-care behavioral goal setting  Diabetes type: Type 2  Date of diagnosis: 7/23/19  Disease course: Stable  How confident are you filling out medical forms by yourself:: Extremely  Diabetes management related comments/concerns: I know what I should eat I just don't pay attention  Transportation concerns: No  Other concerns:: None  Cultural Influences/Ethnic Background:  American      Diabetes Symptoms & Complications  Blurred vision: No  Fatigue: No  Neuropathy: No  Foot ulcerations: No  Polydipsia: No  Polyphagia: No  Polyuria: No  Visual change: No  Weakness: No  Weight loss: No  Slow healing wounds: No  Weight trend: Increasing steadily  CVA: No  Heart disease: No  Nephropathy: No  Peripheral neuropathy: No  Peripheral Vascular Disease: No  Retinopathy: No  Sexual dysfunction: No    Patient Problem List and Family Medical History reviewed for relevant medical history, current medical status, and diabetes risk factors.    Vitals:  There were no vitals taken for this visit.  Estimated body mass index is 36.57 kg/m  as calculated from the following:    Height as of 8/5/19: 1.568 m (5' 1.73\").    Weight as of 8/5/19: 89.9 kg (198 lb 3.2 oz).     Wt Readings from Last 10 Encounters:   08/05/19 89.9 kg (198 lb 3.2 oz)   02/22/19 88.6 kg (195 lb 6.4 oz)   01/30/18 89.4 kg (197 lb)   06/30/17 88.7 kg (195 lb 9.6 oz)   10/05/16 85.8 kg (189 lb 3.2 oz)   09/01/16 86.2 kg (190 lb)   07/18/16 86.9 kg (191 lb 8 oz)   06/05/15 87.8 kg (193 lb 8 oz)   07/01/14 86.2 kg (190 lb)   03/26/13 85.7 kg (189 lb)     Last 3 BP:   BP Readings from Last 3 Encounters:   08/05/19 132/78   02/22/19 130/76   01/30/18 112/72 "       History   Smoking Status     Never Smoker   Smokeless Tobacco     Never Used       Labs:  Lab Results   Component Value Date    A1C 6.9 07/23/2019     Lab Results   Component Value Date     07/23/2019     Lab Results   Component Value Date    LDL 99 07/23/2019     HDL Cholesterol   Date Value Ref Range Status   07/23/2019 38 (L) >49 mg/dL Final   ]  GFR Estimate   Date Value Ref Range Status   07/23/2019 90 >60 mL/min/[1.73_m2] Final     Comment:     Non  GFR Calc  Starting 12/18/2018, serum creatinine based estimated GFR (eGFR) will be   calculated using the Chronic Kidney Disease Epidemiology Collaboration   (CKD-EPI) equation.       GFR Estimate If Black   Date Value Ref Range Status   07/23/2019 >90 >60 mL/min/[1.73_m2] Final     Comment:      GFR Calc  Starting 12/18/2018, serum creatinine based estimated GFR (eGFR) will be   calculated using the Chronic Kidney Disease Epidemiology Collaboration   (CKD-EPI) equation.       Lab Results   Component Value Date    CR 0.67 07/23/2019     No results found for: MICROALBUMIN    Healthy Eating  Healthy Eating Assessed Today: Yes  Cultural/Evangelical diet restrictions?: No  Patient on a regular basis: Eats 3 meals a day, Snacks frequently throughout the night, Has a high intake of carbohydrates, Snacks frequently throughout the day  Meal planning: None  Meals include: Breakfast, Lunch, Dinner, Snacks  Beverages: Milk, Soda(Regular soda-likes coke daily 10 oz, milk-3 cups per day 1%)  Has patient met with a dietitian in the past?: No          Being Active  Being Active Assessed Today: Yes  Exercise:: Currently not exercising  Barrier to exercise: None    Monitoring  Monitoring Assessed Today: No    Taking Medications  Taking Medication Assessed Today: Yes  Current Treatments: None, Diet    Problem Solving  Problem Solving Assessed Today: No    Reducing Risks  Reducing Risks Assessed Today: Yes  Diabetes Risks: Sedentary  Lifestyle, Hyperlipidemia, Age over 45 years, Hypertriglyceridemia  CAD Risks: Hypertension, Obesity, Diabetes Mellitus, Sedentary lifestyle, Dyslipidemia, Post-menopausal    Healthy Coping  Healthy Coping Assessed Today: Yes  Emotional response to diabetes: Ready to learn, Concern for health and well-being  Informal Support system:: Family  Stage of change: PREPARATION (Decided to change - considering how)  Difficulty affording diabetes management supplies?: No  Support resources: None  Patient Activation Measure Survey Score:  ALFREDO Score (Last Two) 7/18/2016 10/5/2016   ALFREDO Raw Score 39 35   Activation Score 56.4 72.1   ALFREDO Level 3 3       ASSESSMENT:  Patients current meal intakes include appropriate sized portions of carbohydrates, but her intake is high in milk and sweets and desserts.  Patient is not willing to completely give up sweets.  Patient would benefit from using the plate method and include 45g carbohydrates with meals and try to limit milk and desserts.  If snacking and desserts is difficult to cut back on she may benefit from larger meals.  The past 3 days she had spam and chips which are high in sodium.  She would benefit from limiting processed meats and high sodium foods for her hypertension.    Per weight history review likely her diabetes is more related to weight gain than specific foods she is eating.  She would benefit from following a nutrition and exercise plan that would promote weight loss and improve insulin resistance.    Patient's most recent   Lab Results   Component Value Date    A1C 6.9 07/23/2019    is not meeting goal of 6.5    INTERVENTION:   Diabetes knowledge and skills assessment:     Patient is knowledgeable in diabetes management concepts related to: Taking Medication    Patient needs further education on the following diabetes management concepts: Healthy Eating, Being Active, Monitoring, Problem Solving, Reducing Risks and Healthy Coping    Based on learning assessment  above, most appropriate setting for further diabetes education would be: Individual setting.    Education provided today on:  AADE Self-Care Behaviors:  Diabetes Pathophysiology  Healthy Eating: carbohydrate counting, consistency in amount, composition, and timing of food intake, weight reduction, heart healthy diet, eating out, portion control, plate planning method and label reading    Monitoring: Discussed she could check at home if she was interested but that this is not necessary at this time.       Opportunities for ongoing education and support in diabetes-self management were discussed.    Pt verbalized understanding of concepts discussed and recommendations provided today.       Education Materials Provided:  Carbohydrate Counting, My Plate Planner and How to read a nutrition label.    PLAN:  See Patient Instructions for co-developed, patient-stated behavior change goals.  AVS printed and provided to patient today. See Follow-Up section for recommended follow-up.    Breakfast- add in 1-2 eggs, focusing on more protein    Sodium- 2000mg goal per day or less    Aim for 45g carbohydrates per meal OR total 150-180g per day =10-12 carbohydrate servings      Sol Raygoza MS, RD, LD, CDE    Time Spent: 62 minutes  Encounter Type: Individual    Any diabetes medication dose changes were made via the CDE Protocol and Collaborative Practice Agreement with the patient's primary care provider. A copy of this encounter was shared with the provider.        yougurt- vanilla- regular (YQ)    Out to eat- 1 time per week (bonita escalante,     Mashed potatoes OR rice OR pasta   Fruit- banana

## 2019-09-12 ENCOUNTER — ANCILLARY PROCEDURE (OUTPATIENT)
Dept: MAMMOGRAPHY | Facility: CLINIC | Age: 69
End: 2019-09-12
Attending: INTERNAL MEDICINE
Payer: COMMERCIAL

## 2019-09-12 DIAGNOSIS — Z12.31 VISIT FOR SCREENING MAMMOGRAM: ICD-10-CM

## 2019-09-12 PROCEDURE — 77067 SCR MAMMO BI INCL CAD: CPT | Mod: TC

## 2019-10-01 DIAGNOSIS — I10 ESSENTIAL HYPERTENSION: ICD-10-CM

## 2019-10-02 NOTE — TELEPHONE ENCOUNTER
Duplicate, 1st request.    metoprolol succinate ER (TOPROL-XL) 25 MG 24 hr tablet 180 tablet 2 3/4/2019  No   Sig: TAKE 1 TABLET(25 MG) BY MOUTH TWICE DAILY   Sent to pharmacy as: metoprolol succinate ER (TOPROL-XL) 25 MG 24 hr tablet   Class: E-Prescribe   Order: [id number]   E-Prescribing Status: Receipt confirmed by pharmacy (3/4/2019 11:28 AM CST)     Rut JOSUE CMA ([facility])

## 2019-10-03 RX ORDER — METOPROLOL SUCCINATE 25 MG/1
TABLET, EXTENDED RELEASE ORAL
Qty: 180 TABLET | Refills: 2
Start: 2019-10-03

## 2019-11-01 DIAGNOSIS — E11.9 TYPE 2 DIABETES MELLITUS WITHOUT COMPLICATION, WITHOUT LONG-TERM CURRENT USE OF INSULIN (H): ICD-10-CM

## 2019-11-01 LAB
ALT SERPL W P-5'-P-CCNC: 34 U/L (ref 0–50)
CHOLEST SERPL-MCNC: 143 MG/DL
HBA1C MFR BLD: 6.3 % (ref 0–5.6)
HDLC SERPL-MCNC: 39 MG/DL
LDLC SERPL CALC-MCNC: 55 MG/DL
NONHDLC SERPL-MCNC: 104 MG/DL
TRIGL SERPL-MCNC: 245 MG/DL

## 2019-11-01 PROCEDURE — 84460 ALANINE AMINO (ALT) (SGPT): CPT | Performed by: INTERNAL MEDICINE

## 2019-11-01 PROCEDURE — 36415 COLL VENOUS BLD VENIPUNCTURE: CPT | Performed by: INTERNAL MEDICINE

## 2019-11-01 PROCEDURE — 80061 LIPID PANEL: CPT | Performed by: INTERNAL MEDICINE

## 2019-11-01 PROCEDURE — 83036 HEMOGLOBIN GLYCOSYLATED A1C: CPT | Performed by: INTERNAL MEDICINE

## 2019-11-04 NOTE — PROGRESS NOTES
Subjective     Ayanna Brannon is a 69 year old female who presents to clinic today for the following health issues:      Patient Instructions on Last Visit 8/5/2019:  Patient Instructions   - Crestor one tablet a day. Call your insurance if they cover the medication.    HPI   Diabetic recheck  Patient relates that she has been doing well with managing her diet. She relates that she is eating less carbs and sugar. Patient relates that she is a happy eater and would eat more than she should. She went to see dietician and didn't feel like it was helpful for her. She notes that it would be more helpful if she could be given examples of what kinds of food to eat. She would like more guidelines and recipes to cook from. She is swimming for her daily exercise. She is doing well with Crestor without complications. Patient requests refills for metoprolol.     Lab Results   Component Value Date    A1C 6.3 11/01/2019    A1C 6.9 07/23/2019    A1C 6.0 09/29/2016    A1C 5.8 06/24/2014         Patient Active Problem List   Diagnosis     Urinary incontinence     Essential hypertension     Colon polyps     Advanced directives, counseling/discussion     Impaired fasting glucose     Excess body and facial hair     Hyperlipidemia LDL goal <130     External hemorrhoids     Umbilical hernia with obstruction     Morbid obesity due to excess calories (H)     BMI 36.0-36.9,adult     Diabetes mellitus, type 2 (H)     Past Surgical History:   Procedure Laterality Date     BIOPSY  7/6/16    Uterine for post menoposal bleeding     COLONOSCOPY      polyps first time, clear second     GENITOURINARY SURGERY      Uterine for post menoposal bleeding     GYN SURGERY      Uterine for post menoposal bleeding     HYSTERECTOMY  09/2016       Social History     Tobacco Use     Smoking status: Never Smoker     Smokeless tobacco: Never Used   Substance Use Topics     Alcohol use: Yes     Comment: seldom     Family History   Problem Relation Age of Onset      "Gynecology Mother      Heart Disease Father      Circulatory Maternal Grandfather      Genitourinary Problems Sister      Thyroid Disease Daughter      Coronary Artery Disease Paternal Grandfather      Breast Cancer Maternal Grandmother      Thyroid Disease Daughter          Current Outpatient Medications   Medication Sig Dispense Refill     ASPIRIN LOW STRENGTH 81 MG OR CHEW 1 TABLET DAILY       metoprolol succinate ER (TOPROL-XL) 25 MG 24 hr tablet TAKE 1 TABLET(25 MG) BY MOUTH TWICE DAILY 180 tablet 11     multivitamin, therapeutic with minerals (MULTI-VITAMIN) TABS Take 1 tablet by mouth daily       rosuvastatin (CRESTOR) 5 MG tablet Take 1 tablet (5 mg) by mouth daily 90 tablet 11     Allergies   Allergen Reactions     Nkda [No Known Drug Allergies]        Reviewed and updated as needed this visit by Provider  Tobacco  Allergies  Meds  Problems  Med Hx  Surg Hx  Fam Hx         Review of Systems   ROS COMP: Constitutional, HEENT, cardiovascular, pulmonary, GI, , musculoskeletal, neuro, skin, endocrine and psych systems are negative, except as otherwise noted.    This document serves as a record of the services and decisions personally performed and made by Sarah Ivory MD. It was created on her behalf by Regine Ray, a trained medical scribe. The creation of this document is based on the provider's statements to the medical scribe.  Regine Ray 12:35 PM November 5, 2019      Objective    /68 (BP Location: Left arm, Cuff Size: Adult Large)   Pulse 79   Temp 98.4  F (36.9  C) (Oral)   Resp 16   Ht 1.568 m (5' 1.73\")   Wt 86 kg (189 lb 9.6 oz)   SpO2 96%   BMI 34.98 kg/m    Body mass index is 34.98 kg/m .  Physical Exam   GENERAL: healthy, alert and no distress  RESP: lungs clear to auscultation - no rales, rhonchi or wheezes  CV: regular rate and rhythm, normal S1 S2, no S3 or S4, no murmur, click or rub, no peripheral edema and peripheral pulses strong  ABDOMEN: soft, nontender, no " "hepatosplenomegaly, no masses and bowel sounds normal  PSYCH: mentation appears normal, affect normal/bright      Diagnostic Test Results:  Labs reviewed in Epic  No results found for this or any previous visit (from the past 24 hour(s)).        Assessment & Plan     1. Essential hypertension  Well controlled without complications or side effects.  Doing well with current regimen. No changes to plan.  Refills placed today.  - metoprolol succinate ER (TOPROL-XL) 25 MG 24 hr tablet; TAKE 1 TABLET(25 MG) BY MOUTH TWICE DAILY  Dispense: 180 tablet; Refill: 11    2. Type 2 diabetes mellitus without complication, without long-term current use of insulin (H)  Her most recent a1c is 6.3.  Well controlled without complications or side effects.  Doing well with current regimen. No changes to plan.  Refills placed today.  - rosuvastatin (CRESTOR) 5 MG tablet; Take 1 tablet (5 mg) by mouth daily  Dispense: 90 tablet; Refill: 11      Patient Instructions   - Schedule eye exam.  - Consider shingle vaccine.  - Consider pneumonia vaccine. Take the slip down to the Pharmacy.       BMI:   Estimated body mass index is 36.57 kg/m  as calculated from the following:    Height as of 8/5/19: 1.568 m (5' 1.73\").    Weight as of 8/5/19: 89.9 kg (198 lb 3.2 oz).   Weight management plan: Discussed healthy diet and exercise guidelines      The information in this document, created by the medical scribe for me, accurately reflects the services I personally performed and the decisions made by me. I have reviewed and approved this document for accuracy prior to leaving the patient care area.  November 5, 2019 12:35 PM    I spent 23 minutes of time with the patient and >50% of it was in education and counseling regarding health maintenance and medication recheck.  In: 12:34 PM  Out: 12:57 PM      Sarah Ivory MD  [facility]    "

## 2019-11-05 ENCOUNTER — OFFICE VISIT (OUTPATIENT)
Dept: INTERNAL MEDICINE | Facility: CLINIC | Age: 69
End: 2019-11-05
Payer: COMMERCIAL

## 2019-11-05 VITALS
DIASTOLIC BLOOD PRESSURE: 68 MMHG | SYSTOLIC BLOOD PRESSURE: 132 MMHG | WEIGHT: 189.6 LBS | BODY MASS INDEX: 34.89 KG/M2 | HEART RATE: 79 BPM | HEIGHT: 62 IN | OXYGEN SATURATION: 96 % | RESPIRATION RATE: 16 BRPM | TEMPERATURE: 98.4 F

## 2019-11-05 DIAGNOSIS — E11.9 TYPE 2 DIABETES MELLITUS WITHOUT COMPLICATION, WITHOUT LONG-TERM CURRENT USE OF INSULIN (H): Primary | ICD-10-CM

## 2019-11-05 DIAGNOSIS — I10 ESSENTIAL HYPERTENSION: ICD-10-CM

## 2019-11-05 PROCEDURE — 99213 OFFICE O/P EST LOW 20 MIN: CPT | Performed by: INTERNAL MEDICINE

## 2019-11-05 RX ORDER — ROSUVASTATIN CALCIUM 5 MG/1
5 TABLET, COATED ORAL DAILY
Qty: 90 TABLET | Refills: 11 | Status: SHIPPED | OUTPATIENT
Start: 2019-11-05 | End: 2020-12-14

## 2019-11-05 RX ORDER — METOPROLOL SUCCINATE 25 MG/1
TABLET, EXTENDED RELEASE ORAL
Qty: 180 TABLET | Refills: 11 | Status: SHIPPED | OUTPATIENT
Start: 2019-11-05 | End: 2020-12-14

## 2019-11-05 ASSESSMENT — MIFFLIN-ST. JEOR: SCORE: 1339.02

## 2019-11-05 ASSESSMENT — PAIN SCALES - GENERAL: PAINLEVEL: NO PAIN (0)

## 2019-11-05 NOTE — PATIENT INSTRUCTIONS
- Schedule eye exam.  - Consider shingle vaccine.  - Consider pneumonia vaccine. Take the slips down to the Pharmacy.  - You can do a lab appointment for A1c in 3 months.

## 2019-11-25 ENCOUNTER — TELEPHONE (OUTPATIENT)
Dept: INTERNAL MEDICINE | Facility: CLINIC | Age: 69
End: 2019-11-25

## 2019-11-25 NOTE — TELEPHONE ENCOUNTER
Panel Management Review      Patient has the following on her problem list:     Hypertension   Last three blood pressure readings:  BP Readings from Last 3 Encounters:   11/05/19 132/68   08/05/19 132/78   02/22/19 130/76     Blood pressure: MONITOR    HTN Guidelines:  Less than 140/90      Composite cancer screening  Chart review shows that this patient is due/due soon for the following None  Summary:    Patient is due/failing the following: Eye Exam    Action needed:   Patient needs office visit for eye e4xam.    Type of outreach:    Sent [employer] message.    Questions for provider review:    None                                                                                                                                    LS     Chart routed to none .

## 2019-12-10 NOTE — TELEPHONE ENCOUNTER
Patient read [employer] message and looks like she has an eye exam appointment scheduled for 1/7/2020  Thank you  LS

## 2020-01-07 ENCOUNTER — OFFICE VISIT (OUTPATIENT)
Dept: OPTOMETRY | Facility: CLINIC | Age: 70
End: 2020-01-07
Payer: COMMERCIAL

## 2020-01-07 DIAGNOSIS — H52.223 REGULAR ASTIGMATISM OF BOTH EYES: ICD-10-CM

## 2020-01-07 DIAGNOSIS — H52.4 PRESBYOPIA: ICD-10-CM

## 2020-01-07 DIAGNOSIS — E11.9 TYPE 2 DIABETES MELLITUS WITHOUT COMPLICATION, WITHOUT LONG-TERM CURRENT USE OF INSULIN (H): Primary | ICD-10-CM

## 2020-01-07 DIAGNOSIS — H33.311 HORSESHOE TEAR OF RETINA OF RIGHT EYE WITHOUT DETACHMENT: ICD-10-CM

## 2020-01-07 PROCEDURE — 92015 DETERMINE REFRACTIVE STATE: CPT | Performed by: OPTOMETRIST

## 2020-01-07 PROCEDURE — 92004 COMPRE OPH EXAM NEW PT 1/>: CPT | Performed by: OPTOMETRIST

## 2020-01-07 ASSESSMENT — KERATOMETRY
OS_AXISANGLE2_DEGREES: 20
OS_K1POWER_DIOPTERS: 44.25
OS_K2POWER_DIOPTERS: 44.50
OD_K2POWER_DIOPTERS: 44.50
OD_AXISANGLE2_DEGREES: 38
OD_K1POWER_DIOPTERS: 45.75

## 2020-01-07 ASSESSMENT — VISUAL ACUITY
OD_SC: 20/20
OS_SC: 20/20
OD_CC: 20/30-1
OD_SC+: -2
OS_CC: 20/30-2
CORRECTION_TYPE: GLASSES
METHOD: SNELLEN - LINEAR
OS_SC+: -1

## 2020-01-07 ASSESSMENT — CUP TO DISC RATIO
OD_RATIO: 0.4
OS_RATIO: 0.6

## 2020-01-07 ASSESSMENT — REFRACTION_MANIFEST
OS_SPHERE: PLANO
OD_CYLINDER: +1.00
OD_AXIS: 002
OD_ADD: +2.00
OS_AXIS: 180
OS_CYLINDER: +0.50
OS_SPHERE: 0.00
OS_AXIS: 174
OD_SPHERE: -0.25
OS_CYLINDER: +0.50
OD_AXIS: 005
OS_ADD: +2.00
OD_CYLINDER: +0.50
METHOD_AUTOREFRACTION: 1
OD_SPHERE: -0.50

## 2020-01-07 ASSESSMENT — REFRACTION_WEARINGRX
OD_SPHERE: +1.50
OS_SPHERE: +1.50
SPECS_TYPE: OTC'S

## 2020-01-07 ASSESSMENT — TONOMETRY
OD_IOP_MMHG: 16
OS_IOP_MMHG: 16
IOP_METHOD: APPLANATION

## 2020-01-07 ASSESSMENT — SLIT LAMP EXAM - LIDS
COMMENTS: NORMAL
COMMENTS: NORMAL

## 2020-01-07 ASSESSMENT — CONF VISUAL FIELD
OD_NORMAL: 1
METHOD: COUNTING FINGERS
OS_NORMAL: 1

## 2020-01-07 NOTE — PROGRESS NOTES
Chief Complaint   Patient presents with     Diabetic Eye Exam     Recently diagnosed with Diabetes, New to Eye Dept         Hemoglobin A1C   Date Value Ref Range Status   11/01/2019 6.3 (H) 0 - 5.6 % Final     Comment:     Normal <5.7% Prediabetes 5.7-6.4%  Diabetes 6.5% or higher - adopted from ADA   consensus guidelines.     07/23/2019 6.9 (H) 0 - 5.6 % Final     Comment:     Normal <5.7% Prediabetes 5.7-6.4%  Diabetes 6.5% or higher - adopted from ADA   consensus guidelines.     09/29/2016 6.0 4.3 - 6.0 % Final         Last Eye Exam: 5+ years   Dilated Previously: Yes    What are you currently using to see?  Readers, she said that she does have Rx glasses that she doesn't use     Distance Vision Acuity: Satisfied with vision, no changes or problems     Near Vision Acuity: Satisfied with vision while reading and using computer with readers    Eye Comfort: good usually, do get uncomfortable with prolonged use of the computer   Do you use eye drops? 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       OBJECTIVE: See Ophthalmology exam    ASSESSMENT:    ICD-10-CM    1. Type 2 diabetes mellitus without complication, without long-term current use of insulin (H) E11.9 EYE EXAM (SIMPLE-NONBILLABLE)     REFRACTION   2. Horseshoe tear of retina of right eye without detachment H33.311 EYE EXAM (SIMPLE-NONBILLABLE)     REFRACTION     OPHTHALMOLOGY ADULT REFERRAL   3. Regular astigmatism of both eyes H52.223 EYE EXAM (SIMPLE-NONBILLABLE)     REFRACTION   4. Presbyopia H52.4 EYE EXAM (SIMPLE-NONBILLABLE)     REFRACTION      PLAN:    Ayanna Brannon aware  eye exam results will be sent to Sarah Ivory.  Patient Instructions   Patient was advised of today's exam findings.  Reading glasses advised  Keep blood sugar under good control  Return in 1 year for diabetic eye exam    Refer to [facility] for retinal evaluation due to retinal tear  right eye.        Diabetes weakens the blood vessels all over the body, including the eyes. Damage to the blood vessels in the eyes can cause swelling or bleeding into part of the eye (called the retina). This is called diabetic retinopathy (DERRICK-tin-AH-pu-thee). If not treated, this disease can cause vision loss or blindness.   Symptoms may include blurred or distorted vision, but many people have no symptoms. It's important to see your eye doctor regularly to check for problems.   Early treatment and good control can help protect your vision. Here are the things you can do to help prevent vision loss:      1. Keep your blood sugar levels under tight control.      2. Bring high blood pressure under control.      3. No smoking.      4. Have yearly dilated eye exams.      Marilee Sanchez O.D.  [facility]   [address] [place], [place] [id number] [phone]

## 2020-01-07 NOTE — LETTER
1/7/2020         RE: Ayanna Brannon  [address] [address] [id number]        Dear Colleague,    Thank you for referring your patient, Ayanna Brannon, to the [facility]. No diabetic retinopathy was found at eye exam.     Again, thank you for allowing me to participate in the care of your patient.        Sincerely,        Marilee Sanchez, OD

## 2020-01-07 NOTE — LETTER
[facility]  [address] [place] [address]  [phone]  Fax [phone]    2020     [facility]  [address], [address]  [place] [address]  [phone], FAX: [phone]    Regarding   Ayanna Brannon      :    1950    MR#:    [id number]   Apt:   Please call  [phone] to schedule appointment     Dear [facility] Provider,    I advised Ayanna Brannon to see you for evaluation of right retinal tear with pigment on a non urgent basis.  Upon questioning  she reported a past history of 1 episode of flashes in the last few years  and denied seeing any floaters.  She has not noticed any changes in her vision.  Last eye exam was more than 5 years ago.     I had the pleasure of seeing her for an diabetic eye exam on 2020 because of her recent diagnosis of type 2 diabetes . 19 A1C was 6.3.  She had no diabetic retinopathy in either eyes.     I will  fax a document with this letter that includes current medications and medical conditions. Thank you for seeing this patient.    Sincerely,                    Marilee Sanchez O.D.               Ayanna Brannon  1950    Patient Active Problem List   Diagnosis     Urinary incontinence     Essential hypertension     Colon polyps     Advanced directives, counseling/discussion     Impaired fasting glucose     Excess body and facial hair     Hyperlipidemia LDL goal <130     External hemorrhoids     Umbilical hernia with obstruction     Morbid obesity due to excess calories (H)     BMI 36.0-36.9,adult     Diabetes mellitus, type 2 (H)     Current Outpatient Medications   Medication     ASPIRIN LOW STRENGTH 81 MG OR CHEW     metoprolol succinate ER (TOPROL-XL) 25 MG 24 hr tablet     multivitamin, therapeutic with minerals (MULTI-VITAMIN) TABS     rosuvastatin (CRESTOR) 5 MG tablet     No current facility-administered medications for this visit.      Allergies   Allergen Reactions      Nkda [No Known Drug Allergies]

## 2020-01-07 NOTE — PATIENT INSTRUCTIONS
Patient was advised of today's exam findings.  Reading glasses advised  Keep blood sugar under good control  Return in 1 year for diabetic eye exam    Refer to [facility] for retinal evaluation due to retinal tear right eye.        Diabetes weakens the blood vessels all over the body, including the eyes. Damage to the blood vessels in the eyes can cause swelling or bleeding into part of the eye (called the retina). This is called diabetic retinopathy (DERRICK-tin-AH-puh-thee). If not treated, this disease can cause vision loss or blindness.   Symptoms may include blurred or distorted vision, but many people have no symptoms. It's important to see your eye doctor regularly to check for problems.   Early treatment and good control can help protect your vision. Here are the things you can do to help prevent vision loss:      1. Keep your blood sugar levels under tight control.      2. Bring high blood pressure under control.      3. No smoking.      4. Have yearly dilated eye exams.      Marilee Sanchez O.D.  [facility]   [address] [place] [address]  [phone]

## 2020-01-30 ENCOUNTER — TRANSFERRED RECORDS (OUTPATIENT)
Dept: HEALTH INFORMATION MANAGEMENT | Facility: CLINIC | Age: 70
End: 2020-01-30

## 2020-02-20 ENCOUNTER — TRANSFERRED RECORDS (OUTPATIENT)
Dept: HEALTH INFORMATION MANAGEMENT | Facility: CLINIC | Age: 70
End: 2020-02-20

## 2020-02-24 ENCOUNTER — HEALTH MAINTENANCE LETTER (OUTPATIENT)
Age: 70
End: 2020-02-24

## 2020-05-08 ENCOUNTER — VIRTUAL VISIT (OUTPATIENT)
Dept: INTERNAL MEDICINE | Facility: CLINIC | Age: 70
End: 2020-05-08
Payer: COMMERCIAL

## 2020-05-08 DIAGNOSIS — I10 ESSENTIAL HYPERTENSION: ICD-10-CM

## 2020-05-08 DIAGNOSIS — E11.9 TYPE 2 DIABETES MELLITUS WITHOUT COMPLICATION, WITHOUT LONG-TERM CURRENT USE OF INSULIN (H): Primary | ICD-10-CM

## 2020-05-08 DIAGNOSIS — E78.5 HYPERLIPIDEMIA LDL GOAL <130: ICD-10-CM

## 2020-05-08 PROCEDURE — 99214 OFFICE O/P EST MOD 30 MIN: CPT | Mod: 95 | Performed by: INTERNAL MEDICINE

## 2020-05-08 NOTE — PROGRESS NOTES
"Ayanna Brannon is a 70 year old female who is being evaluated via a billable telephone visit.      The patient has been notified of following:   \"This telephone visit will be conducted via a call between you and your physician/provider. We have found that certain health care needs can be provided without the need for a physical exam. This service lets us provide the care you need with a short phone conversation. If a prescription is necessary we can send it directly to your pharmacy. If lab work is needed we can place an order for that and you can then stop by our lab to have the test done at a later time.  Telephone visits are billed at different rates depending on your insurance coverage. During this emergency period, for some insurers they may be billed the same as an in-person visit. Please reach out to your insurance provider with any questions.  If during the course of the call the physician/provider feels a telephone visit is not appropriate, you will not be charged for this service.\"    Patient has given verbal consent for Telephone visit?  Yes    What phone number would you like to be contacted at? [phone]    How would you like to obtain your AVS? Robbyhart    Subjective   Ayanna Brannon is a 70 year old female who presents to clinic today for the following health issues:  120/70 is her blood pressure   Weight has been stable since 1/2020 and that is frustrating.   HPI  Diabetes Follow-up    How often are you checking your blood sugar? Not at all    What concerns do you have today about your diabetes? None     Do you have any of these symptoms? (Select all that apply)  No numbness or tingling in feet.  No redness, sores or blisters on feet.  No complaints of excessive thirst.  No reports of blurry vision.  No significant changes to weight.    BP Readings from Last 2 Encounters:   11/05/19 132/68   08/05/19 132/78     Hemoglobin A1C (%)   Date Value   11/01/2019 6.3 (H)   07/23/2019 6.9 (H)     LDL " Cholesterol Calculated (mg/dL)   Date Value   11/01/2019 55   07/23/2019 99     Hyperlipidemia Follow-Up    Are you regularly taking any medication or supplement to lower your cholesterol?   Yes- Rosuvastatin    Are you having muscle aches or other side effects that you think could be caused by your cholesterol lowering medication?  No    Hypertension Follow-up    Do you check your blood pressure regularly outside of the clinic? No     Are you following a low salt diet? No    Are your blood pressures ever more than 140 on the top number (systolic) OR more   than 90 on the bottom number (diastolic), for example 140/90? No      How many servings of fruits and vegetables do you eat daily?  2-3    On average, how many sweetened beverages do you drink each day (Examples: soda, juice, sweet tea, etc.  Do NOT count diet or artificially sweetened beverages)?  One a week     How many days per week do you exercise enough to make your heart beat faster? 3 or less    How many minutes a day do you exercise enough to make your heart beat faster? 9 or less    How many days per week do you miss taking your medication? 0                 Reviewed and updated as needed this visit by Provider  Tobacco  Allergies  Meds  Problems  Med Hx  Surg Hx  Fam Hx         Review of Systems    ROS: 10 point ROS neg other than the symptoms noted above in the HPI.         Objective   Reported vitals:  There were no vitals taken for this visit.   healthy, alert and no distress  PSYCH: Alert and oriented times 3; coherent speech, normal   rate and volume, able to articulate logical thoughts, able   to abstract reason, no tangential thoughts, no hallucinations   or delusions  Her affect is normal  RESP: No cough, no audible wheezing, able to talk in full sentences  Remainder of exam unable to be completed due to telephone visits    Diagnostic Test Results:  Labs reviewed in Epic  No results found for this or any previous visit (from the past 24  hour(s)).        Assessment/Plan:  1. Type 2 diabetes mellitus without complication, without long-term current use of insulin (H)  Will continue to work on diet and exercise for weight loss.  Doesn't check her blood sugars  - Hemoglobin A1c; Future  - AMBULATORY ADULT DIABETES EDUCATOR REFERRAL; Future    2. Essential hypertension  Well controlled with medications without side effects.     3. Hyperlipidemia LDL goal <130  Well controlled with medications without side effects.       No follow-ups on file.      Phone call duration:  11 minutes    Sarah Ivory MD    Start 11:35 AM   Stop 11:46 AM

## 2020-05-15 ENCOUNTER — TELEPHONE (OUTPATIENT)
Dept: FAMILY MEDICINE | Facility: CLINIC | Age: 70
End: 2020-05-15

## 2020-05-15 NOTE — TELEPHONE ENCOUNTER
Diabetes Education Scheduling Outreach #1:    Call to patient to schedule. Left message with phone number to call to schedule.    Plan for 2nd outreach attempt within 1 week.    Africa Whitfield  [facility]  Diabetes and Nutrition Scheduling

## 2020-05-20 DIAGNOSIS — E11.9 TYPE 2 DIABETES MELLITUS WITHOUT COMPLICATION, WITHOUT LONG-TERM CURRENT USE OF INSULIN (H): ICD-10-CM

## 2020-05-20 LAB — HBA1C MFR BLD: 6.5 % (ref 0–5.6)

## 2020-05-20 PROCEDURE — 36415 COLL VENOUS BLD VENIPUNCTURE: CPT | Performed by: INTERNAL MEDICINE

## 2020-05-20 PROCEDURE — 83036 HEMOGLOBIN GLYCOSYLATED A1C: CPT | Performed by: INTERNAL MEDICINE

## 2020-05-28 ENCOUNTER — ALLIED HEALTH/NURSE VISIT (OUTPATIENT)
Dept: EDUCATION SERVICES | Facility: CLINIC | Age: 70
End: 2020-05-28
Payer: COMMERCIAL

## 2020-05-28 DIAGNOSIS — E11.9 TYPE 2 DIABETES MELLITUS WITHOUT COMPLICATION, WITHOUT LONG-TERM CURRENT USE OF INSULIN (H): Primary | ICD-10-CM

## 2020-05-28 PROCEDURE — G0108 DIAB MANAGE TRN  PER INDIV: HCPCS | Mod: 95

## 2020-05-28 NOTE — PROGRESS NOTES
"    Diabetes Self-Management Education & Support    Presents for: Follow-up via phone    Patient verbally consented to the telephone visit service today: yes        SUBJECTIVE/OBJECTIVE:  Presents for: Follow-up  Diabetes education in the past 24mo: No  Focus of Visit: Healthy Eating,  Assistance w/ making life changes, Self-care behavioral goal setting  Diabetes type: Type 2  Date of diagnosis: 7/23/19  Disease course: Stable  How confident are you filling out medical forms by yourself:: Extremely  Diabetes management related comments/concerns: Bought some sucralose. Is wondering what info there is on that. Can I bake with it? Does it contribute to cravings?  Watches TV at night. Finds this is a hard time because then she wants sweets and is wondering if there is anything else she can do.   Has a food scale that she bought and she does weigh her foods.  Had lost about 10 lb initially after her visit with Sol last year but since January it has been stable.     Transportation concerns: No  Other concerns:: None  Cultural Influences/Ethnic Background:  American    Diabetes Symptoms & Complications:  Fatigue: No  Neuropathy: No  Polydipsia: No  Polyphagia: No  Polyuria: No  Visual change: No  Slow healing wounds: No  Weight trend: Stable  Complications assessed today?: Yes  CVA: No  Heart disease: No  Nephropathy: No  Peripheral neuropathy: No  Foot ulcerations: No  Peripheral Vascular Disease: No  Retinopathy: No  Sexual dysfunction: No    Patient Problem List and Family Medical History reviewed for relevant medical history, current medical status, and diabetes risk factors.    Vitals:  There were no vitals taken for this visit.  Estimated body mass index is 34.98 kg/m  as calculated from the following:    Height as of 11/5/19: 1.568 m (5' 1.73\").    Weight as of 11/5/19: 86 kg (189 lb 9.6 oz).   Last 3 BP:   BP Readings from Last 3 Encounters:   11/05/19 132/68   08/05/19 132/78   02/22/19 130/76       History "   Smoking Status     Never Smoker   Smokeless Tobacco     Never Used       Labs:  Lab Results   Component Value Date    A1C 6.5 05/20/2020     Lab Results   Component Value Date     07/23/2019     Lab Results   Component Value Date    LDL 55 11/01/2019     HDL Cholesterol   Date Value Ref Range Status   11/01/2019 39 (L) >49 mg/dL Final   ]  GFR Estimate   Date Value Ref Range Status   07/23/2019 90 >60 mL/min/[1.73_m2] Final     Comment:     Non  GFR Calc  Starting 12/18/2018, serum creatinine based estimated GFR (eGFR) will be   calculated using the Chronic Kidney Disease Epidemiology Collaboration   (CKD-EPI) equation.       GFR Estimate If Black   Date Value Ref Range Status   07/23/2019 >90 >60 mL/min/[1.73_m2] Final     Comment:      GFR Calc  Starting 12/18/2018, serum creatinine based estimated GFR (eGFR) will be   calculated using the Chronic Kidney Disease Epidemiology Collaboration   (CKD-EPI) equation.       Lab Results   Component Value Date    CR 0.67 07/23/2019     No results found for: MICROALBUMIN    Healthy Eating:  Healthy Eating Assessed Today: Yes  Cultural/Synagogue diet restrictions?: No  Meal planning/habits: None  Meals include: Breakfast, Lunch, Dinner  Breakfast: yogurt (YQ from [employer] and vanilla flavor - higher protein and lower sugar per pt) and freezer jam that she makes with splenda and walnuts, flax seed, blueberries and water (sometimes milk)  Lunch: supreme pizza (2 slices) from [employer] (regular but she does not eat the edge crust) OR gaspar chicken casserole OR baked beans with hot dog (no bun)  Dinner: hamburger with half bun and green beans with siri OR chicken orzo soup OR casserole (but no cream and had vegetables and breakfast sausage) OR cheeseburger from [employer] with 4 french fries  Snacks: Ankit pudding that she makes with splenda and has this with almonds and strawberry jam OR kettle corn OR peanut butter M&Ms (5 at a  time)  Beverages: Milk, Water(Regular soda-likes coke daily 10 oz, milk-3 cups per day 1%)  Has patient met with a dietitian in the past?: No    Stopped buying coke. Also reduced her carb intake via pasta and bread. Drinks the ICE water now and has maintained these changes.       Has been recording her foods for about a week.   Makes a banana smoothie with PB and nutella sometimes (~1 T each) and a vanilla creamer (2 T fat free and ice).       Being Active:  Being Active Assessed Today: Yes  Exercise:: Yes  Days per week of moderate to strenuous exercise (like a brisk walk): 4 - Walking and swimming 3-5 times per week.   On average, minutes per day of exercise at this level: 20  How intense was your typical exercise? : Light (like stretching or slow walking)  Exercise Minutes per Week: 80  Barrier to exercise: None    Monitoring:  Monitoring Assessed Today: No    Taking Medications:  Taking Medication Assessed Today: Yes  Current Treatments: Diet    Problem Solving:  Problem Solving Assessed Today: No  Is the patient at risk for hypoglycemia?: No  Is the patient at risk for DKA?: No  Does patient have severe weather/disaster plan for diabetes management?: Not Needed  Does patient have sick day plan for diabetes management?: Not Needed      Reducing Risks:  Reducing Risks Assessed Today: Yes  Diabetes Risks: Sedentary Lifestyle, Hyperlipidemia, Age over 45 years, Hypertriglyceridemia  CAD Risks: Hypertension, Obesity, Diabetes Mellitus, Sedentary lifestyle, Dyslipidemia, Post-menopausal    Healthy Coping:  Healthy Coping Assessed Today: Yes  Emotional response to diabetes: Ready to learn, Concern for health and well-being  Informal Support system:: Family  Stage of change: ACTION (Actively working towards change)  Support resources: None  Patient Activation Measure Survey Score:  ALFREDO Score (Last Two) 7/18/2016 10/5/2016   ALFREDO Raw Score 39 35   Activation Score 56.4 72.1   ALFREDO Level 3 3       Diabetes knowledge and  skills assessment:   Patient is knowledgeable in diabetes management concepts related to: Healthy Eating and Being Active    Patient needs further education on the following diabetes management concepts: Healthy Eating, Monitoring, Taking Medication, Problem Solving, Reducing Risks and Healthy Coping    Based on learning assessment above, most appropriate setting for further diabetes education would be: Group class or Individual setting.      INTERVENTIONS:    Education provided today on:  AADE Self-Care Behaviors:  Healthy Eating: carbohydrate counting, consistency in amount, composition, and timing of food intake, weight reduction, heart healthy diet and portion control  Being Active: relationship to blood glucose and praised her on starting some exercise. Educated that our goal is at least 150 min per week of mod intensity exercise for heart health.  Monitoring: purpose and when we would recommend she check her BG. Explained that with Medicare they do not cover supplies if she is not on medications so we would generally wait to teach her how to check her BG until she is started on a diabetes medication (which we are trying to avoid by focusing on lifestyle changes).   Problem Solving: high blood glucose - causes, signs/symptoms, treatment and prevention  Reducing Risks: major complications of diabetes and importance of heart health  Healthy Coping: benefits of making appropriate lifestyle changes    Opportunities for ongoing education and support in diabetes-self management were discussed.    Pt verbalized understanding of concepts discussed and recommendations provided today.       Education Materials Provided:  No new materials provided today      ASSESSMENT:  Ayanna has been making some positive diet changes. Praised her on this and encouraged her to keep these up.  She would benefit from further weight loss and increasing her activity more.      Helped address her questions today. In regards to sweeteners,  explained that moderation is key for these and, while she can bake with them, the product will likely taste different (more bitter potentially). Explained that it would not really help with her sweet cravings because it is still sweet. Would recommend to reduce overall sweet intake to help with cravings generally. She was wondering how many calories and carbs to eat. Discussed that for females for weight loss it is recommended usually to have ~7101-3598 kcal/d and for carbs I would recommend 30-45 grams of carb per meal and 15 grams of carb for 1-2 snacks.     She was wondering about a book discussing fat and weight loss. Educated that it is important to remember that the focus should be heart healthy fats (ie not fried foods, sausage, chino, etc). Discussed that fat is important in her diet and encouraged fats like avocados, nuts, seeds, fish, etc.       Patient's most recent   Lab Results   Component Value Date    A1C 6.5 05/20/2020    is meeting goal of <7.0    PLAN  See Patient Instructions for co-developed, patient-stated behavior change goals.  Will plan to follow up after her next her doctor visit/lab work as needed.  Continue to work on weight loss.   Aim for 30-45 grams of carb per meal.   AVS printed and provided to patient today. See Follow-Up section for recommended follow-up.    OSCAR Pedersen CDE    Time Spent: 51 minutes  Encounter Type: Individual    Any diabetes medication dose changes were made via the CDE Protocol and Collaborative Practice Agreement with the patient's referring provider. A copy of this encounter was shared with the provider.

## 2020-05-28 NOTE — PATIENT INSTRUCTIONS
Snack Ideas for your Meal Plan     Protein (1 serving = 6-8 grams of protein each)    Beef Jerky ( 2 pieces)    Beef Sticks, plain (1 stick)    Cheese/Cheese Stick (1 oz)    Chicken breast (1 oz)    Cottage cheese, low fat (1/4 cup)    Crab, Imitation (2 oz)    Deli Meat (2 oz)    Edamame, boiled (1/2 cup)    Egg, hardboiled (1)    Shrimp, small (10 pieces)     Turkey Breast (1 oz)    Tuna, canned in water (1 oz)    Fairlife Milk (1/2 cup)    Yogurt, Greek : Siggis, Oikos Triple Zero, Dannon Light and Fit, etc (6 oz)    Carb (1 carb choice/serving = 15 grams)     Apple (medium)    Applesauce, unsweetened (1/2 cup)    Apricots, dried (4 whole)    Banana, medium (1/2)    Bread, 1 slice     Cantaloupe/Melon (1 cup)    Crackers 4-6 (varies by brand)    Cherries (15)    Cranberries, Dried (2 tbsp)    Dark Chocolate (1 oz)    Dates, dried (2-3 pieces)    Fruit cocktail, in own juice (1/2 cup)    Granola Bar (vary by brand)    Grapes (1/2 cup)    Ice cream, slow churned/low fat (1/2 cup)    Kiwi (~2)    Mixed Berries (1 cup)    Orange (1 medium)    Peach (1 medium)    Pear (1/2 medium)    Plums (2)    Pomegranate (1 small)    Popcorn, stove popped (2 cups)    Pretzels (3/4 oz)    Pudding, sugar free (1/2 cup)    Raisins (2 Tbsp)    Rice Cake, (2)    Skim or 1% milk (1 cup)    Tortilla Chips (1 oz)    Yogurt (greek or plain)   cup    Fat (1 serving = 100 calories)    Almonds (2 Tbsp)    Avocado (1/3 fruit OR 3 Tbsp)    Cashews (11 whole)    Cheese (1 oz)    Chocolate, dark (3/4 oz)    Coconut, unsweetened (1/3 c shredded)    Hummus (3 Tbsp)    Peanuts (20 pieces)    Peanut/Nut Butter (1 Tbsp)    Pecans (10 halves)    Pumpkin seeds, unshelled (2 Tbsp)    Salad dressing  (1-2 Tbsp)    Sunflower seeds (2 Tbsp)    Vegetable Dip (1-2 Tbsp)    Walnuts (8 halves)    Free Foods    Bell Peppers    Broccoli    Carrots     Cauliflower     Celery    Coffee, black (regular or decaf)    Cucumber    Gelatin, Sugar Free    Herbal Tea,  unsweetened     Pea Pods    Pickles (high in sodium)     Popsicle, Sugar Free    Salsa (2 Tbsp)    Tomatoes    Combination Snacks    Apple + 1 Tbsp peanut butter (carbohydrate + fat)    Handful crackers + 1 oz cheese (carbohydrate + fat or protein)    Carrot sticks + Hummus (free food + fat)    1 piece of peanut butter toast (carbohydrate + fat)    Greek yogurt + 2 Tbsp sunflower seeds (carbohydrate + fat)    Hard-boiled egg +   cup grapes (protein + carbohydrate)    1 piece of avocado toast (carbohydrate + fat)    Cucumbers + dip (free food + fat)    Sample 3 Carb Breakfast Choices- Carbohydrate choices found in (  )    cup cooked cereal (1.5)  1 cup blueberries (1)  4 oz skim milk (0.5)  2 tablespoons of nuts (0)   1 whole grain English muffin (2)    cup mixed fruit (1)  1 boiled egg (0) 6 oz light yogurt (1)  1 small orange (1)  1 slice whole grain toast (1)  1 slice low fat cheese (0)    1 small whole grain tortilla (1)  1 cup skim milk (1)     banana (1)  1 Tablespoon peanut butter (0)   8 oz skim milk (1) + 1 packet of instant breakfast mix (2)   2 slices whole grain toast (2)   1 cup skim milk (1)  1 scrambled egg (0) 1 cup cubed sweet potatoes (2)  1 scrambled egg  (0)  Vegetables (0)  1 cup skim milk (1)   1 cup of skim milk (1)  1 medium banana (2)  1-2 tablespoons of peanut butter  (0)       3 Carbohydrate Choice Sample Meal Plans for Lunch or Supper   Carbohydrate choices found in (   )  2/3 cup whole grain spaghetti noodles (2) +    cup low sodium pasta sauce (1)+ 4 oz lean ground beef or turkey (0) +  2 cups lettuce + veggies (0) + 1-2 tsbp salad dressing (0) 2 slices of whole grain bread (2)  3 oz lean turkey (0) + lettuce/tomato (0)  2 tsp pal (0)  1 small apple (1) 1 cup of low sodium broth based soup (1) +  2 slices of whole grain bread (2)   2 oz of low fat cheese (0) + 1 tsp butter (0)  Carrots/celery (0) 2 cups lettuce salad (0) +   cup garbanzo beans (1) + +   cup tuna (0) + 2 Tablespoons  low-fat vinaigrette salad dressing (0) +   cup grapes (1) + 6 oz light yogurt (1)   1 cup low sodium broth based soup (1) + 6 saltine crackers (1) + 1 small apple (1) + broccoli/cauliflower and low fat dip (0)   3 oz lean steak (0) + 1 small 3 oz baked potato (1) + 1 tsp low fat sour cream (0) + 1 cup green beans (0) + 1 small dinner roll (1)   1 cup berries (1) + 1 tbsp light whipped cream (0)  2 cups lettuce salad (0)   3 oz grilled chicken (0)  1-2 Tsbp light Caesar dressing (0)  + 1 Tbps grated cheese (0)    cup grapes (1)  5 Triscuit crackers (1)  8 oz skim milk (1)   3 oz chicken (0)  1 cup sweet potato (2)  1 cup asparagus (0)  1 small apple (1)  16 oz sparkling water (unsweetened)   1 hamburger latoya (0) on hamburger bun (2) + 1/2 order of small perez (1) + + 1 garden salad (0) with 1 package of vinaigrette (0) + 1 cup baby carrots + 1/2 cup green beans-cooked (0)  2 slices whole grain bread (2)+ 2 oz lean ham (0) + leaf lettuce/tomato/onion (0)  2 teaspoons pal (0)  1 small pear (1)  1 cup raw veggie sticks (0) 2 slices thin crust pizza (2) + 2 cups lettuce salad (0) + 10 cherry tomatoes (0) + 2 Tbsp light salad dressing (0) + 1 small peach (1) 2 small whole grain tortillas (2) +   cup fat free refried beans (1) + 3 oz shredded lean beef (0) + 2 Tsbp salsa (0)+ lettuce/tomato (0) + 1 Tbsp light sour cream (0)   1 cup low sodium chicken noodle soup (1) + 2 slices whole grain bread (2) + 2 oz lean turkey (0) + 1 oz low fat cheese (0) + 1-2 teaspoons of butter or margarine  3 oz turkey (0) + 1 cup mashed potatoes (2) + 1 tsp butter (0) + 1 cup green beans (0) +   cup unsweetened apple sauce (1) 3 oz chicken + 2 medium pierogis (2) + 1/3 cup cabbage (0) +   cup grapes (1) +   cup green beans (0) I cup of beef stroganoff (0) + 2/3 cup egg noodles (2) + 1 cup broccoli (0) + 1 cup carrots (0) + 1 cup of watermelon (1)     [email or web address]  [email or web address]    For weight loss, aim for 0348-0259 calories for  weight loss and 30-45 grams of carb per meal.

## 2020-10-02 ENCOUNTER — TRANSFERRED RECORDS (OUTPATIENT)
Dept: HEALTH INFORMATION MANAGEMENT | Facility: CLINIC | Age: 70
End: 2020-10-02

## 2020-10-02 LAB — ALBUMIN (URINE) MG/SPEC: <7

## 2020-12-01 ENCOUNTER — DOCUMENTATION ONLY (OUTPATIENT)
Dept: FAMILY MEDICINE | Facility: CLINIC | Age: 70
End: 2020-12-01

## 2020-12-01 DIAGNOSIS — I10 ESSENTIAL HYPERTENSION: Primary | ICD-10-CM

## 2020-12-01 DIAGNOSIS — E78.5 HYPERLIPIDEMIA LDL GOAL <130: ICD-10-CM

## 2020-12-01 DIAGNOSIS — R73.01 IMPAIRED FASTING GLUCOSE: ICD-10-CM

## 2020-12-01 NOTE — PROGRESS NOTES
Please review pended lab orders for upcoming lab appointment on 12/7/20    Thank you,  [facility]

## 2020-12-07 DIAGNOSIS — R73.01 IMPAIRED FASTING GLUCOSE: ICD-10-CM

## 2020-12-07 DIAGNOSIS — I10 ESSENTIAL HYPERTENSION: ICD-10-CM

## 2020-12-07 DIAGNOSIS — E78.5 HYPERLIPIDEMIA LDL GOAL <130: ICD-10-CM

## 2020-12-07 LAB
ANION GAP SERPL CALCULATED.3IONS-SCNC: 5 MMOL/L (ref 3–14)
BUN SERPL-MCNC: 20 MG/DL (ref 7–30)
CALCIUM SERPL-MCNC: 8.7 MG/DL (ref 8.5–10.1)
CHLORIDE SERPL-SCNC: 108 MMOL/L (ref 94–109)
CHOLEST SERPL-MCNC: 137 MG/DL
CO2 SERPL-SCNC: 27 MMOL/L (ref 20–32)
CREAT SERPL-MCNC: 0.65 MG/DL (ref 0.52–1.04)
GFR SERPL CREATININE-BSD FRML MDRD: >90 ML/MIN/{1.73_M2}
GLUCOSE SERPL-MCNC: 141 MG/DL (ref 70–99)
HBA1C MFR BLD: 6.9 % (ref 0–5.6)
HDLC SERPL-MCNC: 43 MG/DL
LDLC SERPL CALC-MCNC: 55 MG/DL
NONHDLC SERPL-MCNC: 94 MG/DL
POTASSIUM SERPL-SCNC: 4.1 MMOL/L (ref 3.4–5.3)
SODIUM SERPL-SCNC: 140 MMOL/L (ref 133–144)
TRIGL SERPL-MCNC: 195 MG/DL

## 2020-12-07 PROCEDURE — 83036 HEMOGLOBIN GLYCOSYLATED A1C: CPT | Performed by: INTERNAL MEDICINE

## 2020-12-07 PROCEDURE — 80048 BASIC METABOLIC PNL TOTAL CA: CPT | Performed by: INTERNAL MEDICINE

## 2020-12-07 PROCEDURE — 36415 COLL VENOUS BLD VENIPUNCTURE: CPT | Performed by: INTERNAL MEDICINE

## 2020-12-07 PROCEDURE — 80061 LIPID PANEL: CPT | Performed by: INTERNAL MEDICINE

## 2020-12-10 NOTE — PROGRESS NOTES
"SUBJECTIVE:   Ayanna Brannon is a 70 year old female who presents for Preventive Visit.  Patient has been advised of split billing requirements and indicates understanding: Yes   Are you in the first 12 months of your Medicare coverage?  No  Healthy Habits:     In general, how would you rate your overall health?  Excellent    Frequency of exercise:  1 day/week    Duration of exercise:  Less than 15 minutes    Do you usually eat at least 4 servings of fruit and vegetables a day, include whole grains    & fiber and avoid regularly eating high fat or \"junk\" foods?  No    Taking medications regularly:  Yes    Medication side effects:  None    Ability to successfully perform activities of daily living:  No assistance needed    Home Safety:  No safety concerns identified    Hearing Impairment:  Difficulty following a conversation in a noisy restaurant or crowded room    In the past 6 months, have you been bothered by leaking of urine? Yes    In general, how would you rate your overall mental or emotional health?  Excellent      PHQ-2 Total Score: 0    Additional concerns today:  Yes    Do you feel safe in your environment? Yes    Have you ever done Advance Care Planning? (For example, a Health Directive, POLST, or a discussion with a medical provider or your loved ones about your wishes): No, advance care planning information given to patient to review.  Patient declined advance care planning discussion at this time.    Fall risk  Fallen 2 or more times in the past year?: No  Any fall with injury in the past year?: No    Cognitive Screening   1) Repeat 3 items (Leader, Season, Table)    2) Clock draw: NORMAL  3) 3 item recall: Recalls 3 objects  Results: 3 items recalled: COGNITIVE IMPAIRMENT LESS LIKELY    Mini-CogTM Copyright MAN [employer]. Licensed by the author for use in [facility]; reprinted with permission (judi@.[facility]). All rights reserved.      Do you have sleep apnea, excessive snoring or daytime " drowsiness?: no    Reviewed and updated as needed this visit by clinical staff  Tobacco  Allergies  Meds  Problems  Med Hx  Surg Hx  Fam Hx  Soc Hx          Reviewed and updated as needed this visit by Provider  Tobacco  Allergies  Meds  Problems  Med Hx  Surg Hx  Fam Hx         Social History     Tobacco Use     Smoking status: Never Smoker     Smokeless tobacco: Never Used   Substance Use Topics     Alcohol use: Yes     Comment: seldom     If you drink alcohol do you typically have >3 drinks per day or >7 drinks per week? No    No flowsheet data found.          Current providers sharing in care for this patient include:   Patient Care Team:  Sarah Ivory MD as PCP - General  Sarah Ivory MD as Assigned PCP  Sol Raygoza RD as Diabetes Educator (Dietitian, Registered)  Mira Juarez RD as Diabetes Educator (Dietitian, Registered)    The following health maintenance items are reviewed in Epic and correct as of today:  Health Maintenance   Topic Date Due     COLORECTAL CANCER SCREENING  07/30/2020     EYE EXAM  01/07/2021     A1C  03/07/2021     MAMMO SCREENING  09/12/2021     MICROALBUMIN  10/02/2021     DTAP/TDAP/TD IMMUNIZATION (3 - Td) 11/16/2021     BMP  12/07/2021     LIPID  12/07/2021     MEDICARE ANNUAL WELLNESS VISIT  12/14/2021     DIABETIC FOOT EXAM  12/14/2021     FALL RISK ASSESSMENT  12/14/2021     ADVANCE CARE PLANNING  12/14/2025     DEXA  10/05/2031     PHQ-2  Completed     INFLUENZA VACCINE  Completed     Pneumococcal Vaccine: 65+ Years  Completed     HEPATITIS C SCREENING  Addressed     Pneumococcal Vaccine: Pediatrics (0 to 5 Years) and At-Risk Patients (6 to 64 Years)  Aged Out     IPV IMMUNIZATION  Aged Out     MENINGITIS IMMUNIZATION  Aged Out     ZOSTER IMMUNIZATION  Discontinued     Labs reviewed in EPIC      Review of Systems   Constitutional: Negative for chills and fever.   HENT: Negative for congestion, ear pain, hearing loss and sore throat.    Eyes:  "Negative for pain and visual disturbance.   Respiratory: Negative for cough and shortness of breath.    Cardiovascular: Negative for chest pain, palpitations and peripheral edema.   Gastrointestinal: Negative for abdominal pain, constipation, diarrhea, heartburn, hematochezia and nausea.   Breasts:  Negative for tenderness and discharge.   Genitourinary: Negative for dysuria, frequency, genital sores, hematuria, pelvic pain, urgency, vaginal bleeding and vaginal discharge.   Musculoskeletal: Negative for arthralgias and joint swelling.   Skin: Negative for rash.   Neurological: Negative for dizziness, weakness, headaches and paresthesias.   Psychiatric/Behavioral: Negative for mood changes. The patient is not nervous/anxious.          OBJECTIVE:   BP (!) 144/82   Pulse 69   Temp 98.1  F (36.7  C) (Oral)   Resp 18   Ht 1.575 m (5' 2.01\")   Wt 86.7 kg (191 lb 3.2 oz)   SpO2 96%   BMI 34.96 kg/m   Estimated body mass index is 34.96 kg/m  as calculated from the following:    Height as of this encounter: 1.575 m (5' 2.01\").    Weight as of this encounter: 86.7 kg (191 lb 3.2 oz).  Physical Exam  GENERAL APPEARANCE: healthy, alert and no distress  EYES: Eyes grossly normal to inspection, PERRL and conjunctivae and sclerae normal  HENT: ear canals and TM's normal   NECK: no adenopathy, no asymmetry, masses, or scars and thyroid normal to palpation  RESP: lungs clear to auscultation - no rales, rhonchi or wheezes  BREAST: normal without masses, tenderness or nipple discharge and no palpable axillary masses or adenopathy on the right.  Left with oblong firm tissue at the 12 oclock position  CV: regular rates and rhythm and normal S1 S2, no S3 or S4  LYMPHATICS: normal ant/post cervical and supraclavicular nodes  ABDOMEN: soft, nontender, without hepatosplenomegaly or masses and bowel sounds normal    MS: extremities normal- no gross deformities noted  SKIN: no suspicious lesions or rashes  NEURO: Normal strength and " tone, mentation intact and speech normal  PSYCH: mentation appears normal and affect normal/bright  No edema   1+ posterior tibial pulses bilateral          Diagnostic Test Results:  Labs reviewed in Epic    ASSESSMENT / PLAN:   1. Medicare annual wellness visit, subsequent      2. Essential hypertension  Poor control. Per patient instructions.   - metoprolol succinate ER (TOPROL-XL) 25 MG 24 hr tablet; TAKE 1 TABLET(25 MG) BY MOUTH TWICE DAILY  Dispense: 180 tablet; Refill: 11    3. Type 2 diabetes mellitus without complication, without long-term current use of insulin (H)  Worsening control. Per patient instructions.   - FOOT EXAM  - rosuvastatin (CRESTOR) 5 MG tablet; Take 1 tablet (5 mg) by mouth daily  Dispense: 90 tablet; Refill: 11  - blood glucose monitoring (NO BRAND SPECIFIED) meter device kit; Use to test blood sugar 1 times daily or as directed. Preferred blood glucose meter OR supplies to accompany: Blood Glucose Monitor Brands: per insurance.  Dispense: 1 kit; Refill: 0  - blood glucose (NO BRAND SPECIFIED) test strip; Use to test blood sugar 1 times daily or as directed. To accompany: Blood Glucose Monitor Brands: per insurance.  Dispense: 100 strip; Refill: 6  - blood glucose calibration (NO BRAND SPECIFIED) solution; To accompany: Blood Glucose Monitor Brands: per insurance.  Dispense: 1 Bottle; Refill: 3  - thin (NO BRAND SPECIFIED) lancets; Use with lanceting device. To accompany: Blood Glucose Monitor Brands: per insurance.  Dispense: 100 each; Refill: 6  - alcohol swab prep pads; Use to swab area of injection/ady as directed.  Dispense: 100 each; Refill: 3  - AMBULATORY ADULT DIABETES EDUCATOR REFERRAL; Future    4. Hyperlipidemia LDL goal <130  Well controlled with medications without side effects.     5. Breast lump on left side at 12 o'clock position    - MA Diagnostic Digital Bilateral; Future  - US Breast Left Limited 1-3 Quadrants; Future    6. Screen for colon cancer    -  "GASTROENTEROLOGY ADULT REF PROCEDURE ONLY; Future    Patient has been advised of split billing requirements and indicates understanding: Yes  COUNSELING:  Reviewed preventive health counseling, as reflected in patient instructions    Estimated body mass index is 34.96 kg/m  as calculated from the following:    Height as of this encounter: 1.575 m (5' 2.01\").    Weight as of this encounter: 86.7 kg (191 lb 3.2 oz).    Weight management plan: Discussed healthy diet and exercise guidelines    She reports that she has never smoked. She has never used smokeless tobacco.      Appropriate preventive services were discussed with this patient, including applicable screening as appropriate for cardiovascular disease, diabetes, osteopenia/osteoporosis, and glaucoma.  As appropriate for age/gender, discussed screening for colorectal cancer, prostate cancer, breast cancer, and cervical cancer. Checklist reviewing preventive services available has been given to the patient.    Reviewed patients plan of care and provided an AVS. The Basic Care Plan (routine screening as documented in Health Maintenance) for Ayanna meets the Care Plan requirement. This Care Plan has been established and reviewed with the Patient.    Counseling Resources:  ATP IV Guidelines  Pooled Cohorts Equation Calculator  Breast Cancer Risk Calculator  Breast Cancer: Medication to Reduce Risk  FRAX Risk Assessment  ICSI Preventive Guidelines  Dietary Guidelines for Americans, 2010  [employer]'s MyPlate  ASA Prophylaxis  Lung CA Screening    Sarah Ivory MD  [facility]    Identified Health Risks:    Patient Instructions   Pharmacy blood pressure check after you are able to resume your swimming.  Schedule your colonoscopy.  Start checking your blood sugar and meet with diabetes education.  Schedule your diagnostic ultrasound and mammogram.   "

## 2020-12-13 ASSESSMENT — ENCOUNTER SYMPTOMS
HEADACHES: 0
NERVOUS/ANXIOUS: 0
DIZZINESS: 0
HEARTBURN: 0
JOINT SWELLING: 0
CONSTIPATION: 0
FEVER: 0
COUGH: 0
SORE THROAT: 0
DYSURIA: 0
ARTHRALGIAS: 0
WEAKNESS: 0
PARESTHESIAS: 0
CHILLS: 0
HEMATURIA: 0
FREQUENCY: 0
HEMATOCHEZIA: 0
SHORTNESS OF BREATH: 0
PALPITATIONS: 0
DIARRHEA: 0
EYE PAIN: 0
NAUSEA: 0
ABDOMINAL PAIN: 0

## 2020-12-13 ASSESSMENT — ACTIVITIES OF DAILY LIVING (ADL): CURRENT_FUNCTION: NO ASSISTANCE NEEDED

## 2020-12-14 ENCOUNTER — OFFICE VISIT (OUTPATIENT)
Dept: INTERNAL MEDICINE | Facility: CLINIC | Age: 70
End: 2020-12-14
Payer: COMMERCIAL

## 2020-12-14 VITALS
TEMPERATURE: 98.1 F | HEART RATE: 69 BPM | BODY MASS INDEX: 35.19 KG/M2 | SYSTOLIC BLOOD PRESSURE: 144 MMHG | DIASTOLIC BLOOD PRESSURE: 82 MMHG | WEIGHT: 191.2 LBS | HEIGHT: 62 IN | OXYGEN SATURATION: 96 % | RESPIRATION RATE: 18 BRPM

## 2020-12-14 DIAGNOSIS — Z12.11 SCREEN FOR COLON CANCER: ICD-10-CM

## 2020-12-14 DIAGNOSIS — E11.9 TYPE 2 DIABETES MELLITUS WITHOUT COMPLICATION, WITHOUT LONG-TERM CURRENT USE OF INSULIN (H): ICD-10-CM

## 2020-12-14 DIAGNOSIS — I10 ESSENTIAL HYPERTENSION: ICD-10-CM

## 2020-12-14 DIAGNOSIS — N63.25 BREAST LUMP ON LEFT SIDE AT 12 O'CLOCK POSITION: ICD-10-CM

## 2020-12-14 DIAGNOSIS — E78.5 HYPERLIPIDEMIA LDL GOAL <130: ICD-10-CM

## 2020-12-14 DIAGNOSIS — Z00.00 MEDICARE ANNUAL WELLNESS VISIT, SUBSEQUENT: Primary | ICD-10-CM

## 2020-12-14 PROBLEM — E66.01 MORBID OBESITY DUE TO EXCESS CALORIES (H): Status: RESOLVED | Noted: 2017-06-30 | Resolved: 2020-12-14

## 2020-12-14 PROCEDURE — G0439 PPPS, SUBSEQ VISIT: HCPCS | Performed by: INTERNAL MEDICINE

## 2020-12-14 PROCEDURE — 99207 PR FOOT EXAM NO CHARGE: CPT | Performed by: INTERNAL MEDICINE

## 2020-12-14 PROCEDURE — 99214 OFFICE O/P EST MOD 30 MIN: CPT | Mod: 25 | Performed by: INTERNAL MEDICINE

## 2020-12-14 RX ORDER — GLUCOSAMINE HCL/CHONDROITIN SU 500-400 MG
CAPSULE ORAL
Qty: 100 EACH | Refills: 3 | Status: SHIPPED | OUTPATIENT
Start: 2020-12-14

## 2020-12-14 RX ORDER — ROSUVASTATIN CALCIUM 5 MG/1
5 TABLET, COATED ORAL DAILY
Qty: 90 TABLET | Refills: 11 | Status: SHIPPED | OUTPATIENT
Start: 2020-12-14

## 2020-12-14 RX ORDER — LANCETS
EACH MISCELLANEOUS
Qty: 100 EACH | Refills: 6 | Status: SHIPPED | OUTPATIENT
Start: 2020-12-14

## 2020-12-14 RX ORDER — METOPROLOL SUCCINATE 25 MG/1
TABLET, EXTENDED RELEASE ORAL
Qty: 180 TABLET | Refills: 11 | Status: SHIPPED | OUTPATIENT
Start: 2020-12-14

## 2020-12-14 ASSESSMENT — ENCOUNTER SYMPTOMS
HEMATOCHEZIA: 0
CHILLS: 0
ARTHRALGIAS: 0
DIZZINESS: 0
COUGH: 0
DIARRHEA: 0
SHORTNESS OF BREATH: 0
WEAKNESS: 0
FREQUENCY: 0
EYE PAIN: 0
PARESTHESIAS: 0
NERVOUS/ANXIOUS: 0
HEADACHES: 0
PALPITATIONS: 0
HEARTBURN: 0
FEVER: 0
DYSURIA: 0
ABDOMINAL PAIN: 0
JOINT SWELLING: 0
HEMATURIA: 0
SORE THROAT: 0
NAUSEA: 0
CONSTIPATION: 0

## 2020-12-14 ASSESSMENT — MIFFLIN-ST. JEOR: SCORE: 1345.66

## 2020-12-14 ASSESSMENT — PAIN SCALES - GENERAL: PAINLEVEL: NO PAIN (0)

## 2020-12-14 ASSESSMENT — ACTIVITIES OF DAILY LIVING (ADL): CURRENT_FUNCTION: NO ASSISTANCE NEEDED

## 2020-12-14 NOTE — PATIENT INSTRUCTIONS
Pharmacy blood pressure check after you are able to resume your swimming.  Schedule your colonoscopy.  Start checking your blood sugar and meet with diabetes education.  Schedule your diagnostic ultrasound and mammogram.

## 2020-12-18 ENCOUNTER — TELEPHONE (OUTPATIENT)
Dept: INTERNAL MEDICINE | Facility: CLINIC | Age: 70
End: 2020-12-18

## 2020-12-18 DIAGNOSIS — N63.25 BREAST LUMP ON LEFT SIDE AT 12 O'CLOCK POSITION: Primary | ICD-10-CM

## 2020-12-18 NOTE — TELEPHONE ENCOUNTER
Mammogram order placed, please send to [facility]  Alissa Jenkins RN

## 2020-12-18 NOTE — TELEPHONE ENCOUNTER
----- Message -----   From: Sarah Ivory MD   Sent: 12/16/2020  10:48 AM CST   To: Viki Velez   Subject: FW: Referral Request                               Ok to send orders   ----- Message -----   From: Edith James   Sent: 12/16/2020  10:15 AM CST   To: Sarah Ivory MD   Subject: FW: Referral Request                                  ----- Message -----   From: Ayanna Brannon   Sent: 12/16/2020  10:07 AM CST   To: [facility]   Subject: Referral Request                                   Ayanna Brannon would like to request a referral.   Reason: Closer to home   Requested provider: [facility]   Comment:   Please send a referral for my MA Diagnostic Digital Bilateral breast imaging that you referred to    [facility].  Their closest site is [place].  I would like to go closer to home.  Thank you.

## 2020-12-24 ENCOUNTER — TRANSFERRED RECORDS (OUTPATIENT)
Dept: HEALTH INFORMATION MANAGEMENT | Facility: CLINIC | Age: 70
End: 2020-12-24

## 2021-02-25 ENCOUNTER — TRANSFERRED RECORDS (OUTPATIENT)
Dept: HEALTH INFORMATION MANAGEMENT | Facility: CLINIC | Age: 71
End: 2021-02-25

## 2021-03-09 ENCOUNTER — TELEPHONE (OUTPATIENT)
Dept: FAMILY MEDICINE | Facility: CLINIC | Age: 71
End: 2021-03-09

## 2021-03-09 NOTE — TELEPHONE ENCOUNTER
Diabetes Education Scheduling Outreach #1:    Call to patient to schedule. Left message with phone number to call to schedule.    Plan for 2nd outreach attempt within 1 week.    Africa Whitfield  [place] [facility]  Diabetes and Nutrition Scheduling

## 2021-04-17 ENCOUNTER — HEALTH MAINTENANCE LETTER (OUTPATIENT)
Age: 71
End: 2021-04-17

## 2021-08-01 ENCOUNTER — HEALTH MAINTENANCE LETTER (OUTPATIENT)
Age: 71
End: 2021-08-01

## 2021-09-26 ENCOUNTER — HEALTH MAINTENANCE LETTER (OUTPATIENT)
Age: 71
End: 2021-09-26

## 2021-11-21 ENCOUNTER — HEALTH MAINTENANCE LETTER (OUTPATIENT)
Age: 71
End: 2021-11-21

## 2022-01-16 ENCOUNTER — HEALTH MAINTENANCE LETTER (OUTPATIENT)
Age: 72
End: 2022-01-16

## 2022-05-08 ENCOUNTER — HEALTH MAINTENANCE LETTER (OUTPATIENT)
Age: 72
End: 2022-05-08

## 2022-08-28 ENCOUNTER — HEALTH MAINTENANCE LETTER (OUTPATIENT)
Age: 72
End: 2022-08-28

## 2023-04-23 ENCOUNTER — HEALTH MAINTENANCE LETTER (OUTPATIENT)
Age: 73
End: 2023-04-23

## 2023-09-24 ENCOUNTER — HEALTH MAINTENANCE LETTER (OUTPATIENT)
Age: 73
End: 2023-09-24

## 2024-02-11 ENCOUNTER — HEALTH MAINTENANCE LETTER (OUTPATIENT)
Age: 74
End: 2024-02-11